# Patient Record
Sex: MALE | Race: WHITE | NOT HISPANIC OR LATINO | Employment: OTHER | URBAN - METROPOLITAN AREA
[De-identification: names, ages, dates, MRNs, and addresses within clinical notes are randomized per-mention and may not be internally consistent; named-entity substitution may affect disease eponyms.]

---

## 2019-04-30 RX ORDER — CELECOXIB 100 MG/1
100 CAPSULE ORAL 2 TIMES DAILY
Qty: 60 CAPSULE | Refills: 0 | Status: SHIPPED | OUTPATIENT
Start: 2019-04-30 | End: 2019-06-24 | Stop reason: SDUPTHER

## 2019-04-30 RX ORDER — FLUOXETINE HYDROCHLORIDE 40 MG/1
40 CAPSULE ORAL DAILY
Qty: 30 CAPSULE | Refills: 0 | Status: SHIPPED | OUTPATIENT
Start: 2019-04-30 | End: 2019-06-24 | Stop reason: SDUPTHER

## 2019-04-30 RX ORDER — TAMSULOSIN HYDROCHLORIDE 0.4 MG/1
CAPSULE ORAL
Qty: 180 CAPSULE | Refills: 0 | Status: SHIPPED | OUTPATIENT
Start: 2019-04-30

## 2019-05-28 ENCOUNTER — OFFICE VISIT (OUTPATIENT)
Dept: INTERNAL MEDICINE | Facility: CLINIC | Age: 79
End: 2019-05-28

## 2019-05-28 VITALS — HEIGHT: 73 IN | BODY MASS INDEX: 25.98 KG/M2 | WEIGHT: 196 LBS

## 2019-05-28 DIAGNOSIS — M19.90 ARTHRITIS: ICD-10-CM

## 2019-05-28 DIAGNOSIS — F32.9 REACTIVE DEPRESSION: ICD-10-CM

## 2019-05-28 DIAGNOSIS — G25.81 RLS (RESTLESS LEGS SYNDROME): ICD-10-CM

## 2019-05-28 DIAGNOSIS — I10 ESSENTIAL HYPERTENSION: Primary | ICD-10-CM

## 2019-05-28 PROBLEM — F32.A DEPRESSION: Status: ACTIVE | Noted: 2019-05-28

## 2019-05-28 PROCEDURE — 99213 OFFICE O/P EST LOW 20 MIN: CPT | Performed by: INTERNAL MEDICINE

## 2019-05-28 RX ORDER — PRAMIPEXOLE DIHYDROCHLORIDE 0.25 MG/1
0.25 TABLET ORAL 3 TIMES DAILY
COMMUNITY
End: 2019-08-14 | Stop reason: SDUPTHER

## 2019-05-28 RX ORDER — HYDROCHLOROTHIAZIDE 12.5 MG/1
12.5 TABLET ORAL DAILY
COMMUNITY
End: 2019-10-23 | Stop reason: SDUPTHER

## 2019-05-28 RX ORDER — LISINOPRIL 20 MG/1
20 TABLET ORAL DAILY
COMMUNITY
End: 2019-08-02 | Stop reason: CLARIF

## 2019-05-28 NOTE — PROGRESS NOTES
Assessment and Plan  Adrian was seen today for hypertension.    Diagnoses and all orders for this visit:    Essential hypertension  Comments:  Controlled, doing great  Orders:  -     Comprehensive Metabolic Panel; Future  -     Lipid Panel With / Chol / HDL Ratio; Future    Reactive depression  Comments:  doing great with fluoxetine    RLS (restless legs syndrome)  Comments:  usually takes 2-3 times daily.      Arthritis  Comments:  stable on nsaids.       F/U and Patient Instructions    Return in about 6 months (around 11/28/2019) for Medicare Wellness with labs.  There are no Patient Instructions on file for this visit.    Subjective    Adrian Balderrama is a 79 y.o. male being seen in our office today for Hypertension     History of the Present Illness  HPI  Here for reg f/u- denies any problems. Stays busy-yard work, etc.  Doesn't have problems with taking the pramipexole 2-03 times daily. Works well.  Seeing Dr. Sanders urology annually    Patient History        Significant Past History  The following portions of the patient's history were reviewed and updated as appropriate:PMHroutine: Social history , Allergies, Current Medications, Active Problem List and Health Maintenance              Social History  He  reports that he has quit smoking. He does not have any smokeless tobacco history on file. He reports that he does not drink alcohol or use drugs.                         Review of Symptoms  Review of Systems   Constitution: Negative.   Cardiovascular: Negative.    Respiratory: Negative.    Musculoskeletal: Positive for arthritis.   Genitourinary: Negative.      Objective  Vital Signs         BP Readings from Last 1 Encounters:   No data found for BP     Wt Readings from Last 3 Encounters:   05/28/19 88.9 kg (196 lb)   Body mass index is 25.86 kg/m².          Physical Exam   Physical Exam   Constitutional: No distress.   Cardiovascular: Normal rate and regular rhythm.   Musculoskeletal: He exhibits no edema.    Psychiatric: He has a normal mood and affect. His behavior is normal.     Data Reviewed    No results found for this or any previous visit (from the past 2016 hour(s)).

## 2019-06-24 RX ORDER — FLUOXETINE HYDROCHLORIDE 40 MG/1
CAPSULE ORAL
Qty: 30 CAPSULE | Refills: 0 | Status: SHIPPED | OUTPATIENT
Start: 2019-06-24 | End: 2019-07-22 | Stop reason: SDUPTHER

## 2019-06-24 RX ORDER — CELECOXIB 100 MG/1
CAPSULE ORAL
Qty: 60 CAPSULE | Refills: 0 | Status: SHIPPED | OUTPATIENT
Start: 2019-06-24 | End: 2019-07-22 | Stop reason: SDUPTHER

## 2019-07-22 RX ORDER — CELECOXIB 100 MG/1
CAPSULE ORAL
Qty: 60 CAPSULE | Refills: 3 | Status: SHIPPED | OUTPATIENT
Start: 2019-07-22 | End: 2019-07-23 | Stop reason: SDUPTHER

## 2019-07-22 RX ORDER — FLUOXETINE HYDROCHLORIDE 40 MG/1
CAPSULE ORAL
Qty: 30 CAPSULE | Refills: 4 | Status: SHIPPED | OUTPATIENT
Start: 2019-07-22 | End: 2019-07-23 | Stop reason: SDUPTHER

## 2019-07-23 RX ORDER — CELECOXIB 100 MG/1
100 CAPSULE ORAL 2 TIMES DAILY
Qty: 180 CAPSULE | Refills: 1 | Status: SHIPPED | OUTPATIENT
Start: 2019-07-23 | End: 2020-03-31

## 2019-07-23 RX ORDER — FLUOXETINE HYDROCHLORIDE 40 MG/1
40 CAPSULE ORAL DAILY
Qty: 90 CAPSULE | Refills: 1 | Status: SHIPPED | OUTPATIENT
Start: 2019-07-23 | End: 2020-07-13

## 2019-08-02 RX ORDER — LISINOPRIL 40 MG/1
40 TABLET ORAL DAILY
COMMUNITY
End: 2019-08-02 | Stop reason: SDUPTHER

## 2019-08-02 RX ORDER — LISINOPRIL 40 MG/1
TABLET ORAL
Qty: 180 TABLET | Refills: 0 | Status: SHIPPED | OUTPATIENT
Start: 2019-08-02 | End: 2019-10-23 | Stop reason: SDUPTHER

## 2019-08-14 ENCOUNTER — TELEPHONE (OUTPATIENT)
Dept: INTERNAL MEDICINE | Facility: CLINIC | Age: 79
End: 2019-08-14

## 2019-08-14 RX ORDER — PRAMIPEXOLE DIHYDROCHLORIDE 0.25 MG/1
TABLET ORAL
Qty: 360 TABLET | Refills: 0 | Status: SHIPPED | OUTPATIENT
Start: 2019-08-14 | End: 2019-12-19

## 2019-08-14 RX ORDER — PRAMIPEXOLE DIHYDROCHLORIDE 0.25 MG/1
0.25 TABLET ORAL 3 TIMES DAILY
Qty: 90 TABLET | Refills: 5 | Status: SHIPPED | OUTPATIENT
Start: 2019-08-14 | End: 2019-08-14 | Stop reason: SDUPTHER

## 2019-10-24 RX ORDER — HYDROCHLOROTHIAZIDE 12.5 MG/1
TABLET ORAL
Qty: 90 TABLET | Refills: 0 | Status: SHIPPED | OUTPATIENT
Start: 2019-10-24 | End: 2020-03-04

## 2019-10-24 RX ORDER — LISINOPRIL 40 MG/1
TABLET ORAL
Qty: 180 TABLET | Refills: 0 | Status: SHIPPED | OUTPATIENT
Start: 2019-10-24 | End: 2020-01-31

## 2019-11-19 DIAGNOSIS — I10 ESSENTIAL HYPERTENSION: ICD-10-CM

## 2019-11-19 LAB
ALBUMIN SERPL-MCNC: 4.4 G/DL (ref 3.5–5.2)
ALBUMIN/GLOB SERPL: 2.3 G/DL
ALP SERPL-CCNC: 65 U/L (ref 39–117)
ALT SERPL-CCNC: 14 U/L (ref 1–41)
AST SERPL-CCNC: 16 U/L (ref 1–40)
BILIRUB SERPL-MCNC: 0.5 MG/DL (ref 0.2–1.2)
BUN SERPL-MCNC: 25 MG/DL (ref 8–23)
BUN/CREAT SERPL: 19.8 (ref 7–25)
CALCIUM SERPL-MCNC: 9.1 MG/DL (ref 8.6–10.5)
CHLORIDE SERPL-SCNC: 100 MMOL/L (ref 98–107)
CHOLEST SERPL-MCNC: 161 MG/DL (ref 0–200)
CHOLEST/HDLC SERPL: 3.93 {RATIO}
CO2 SERPL-SCNC: 26.8 MMOL/L (ref 22–29)
CREAT SERPL-MCNC: 1.26 MG/DL (ref 0.76–1.27)
GLOBULIN SER CALC-MCNC: 1.9 GM/DL
GLUCOSE SERPL-MCNC: 95 MG/DL (ref 65–99)
HDLC SERPL-MCNC: 41 MG/DL (ref 40–60)
LDLC SERPL CALC-MCNC: 100 MG/DL (ref 0–100)
POTASSIUM SERPL-SCNC: 4.4 MMOL/L (ref 3.5–5.2)
PROT SERPL-MCNC: 6.3 G/DL (ref 6–8.5)
SODIUM SERPL-SCNC: 139 MMOL/L (ref 136–145)
TRIGL SERPL-MCNC: 101 MG/DL (ref 0–150)
VLDLC SERPL CALC-MCNC: 20.2 MG/DL

## 2019-11-26 ENCOUNTER — OFFICE VISIT (OUTPATIENT)
Dept: INTERNAL MEDICINE | Facility: CLINIC | Age: 79
End: 2019-11-26

## 2019-11-26 VITALS
HEART RATE: 74 BPM | DIASTOLIC BLOOD PRESSURE: 62 MMHG | WEIGHT: 217 LBS | BODY MASS INDEX: 28.63 KG/M2 | SYSTOLIC BLOOD PRESSURE: 124 MMHG

## 2019-11-26 DIAGNOSIS — Z99.89 OSA ON CPAP: ICD-10-CM

## 2019-11-26 DIAGNOSIS — G25.81 RLS (RESTLESS LEGS SYNDROME): Chronic | ICD-10-CM

## 2019-11-26 DIAGNOSIS — I10 ESSENTIAL HYPERTENSION: Primary | ICD-10-CM

## 2019-11-26 DIAGNOSIS — G47.33 OSA ON CPAP: ICD-10-CM

## 2019-11-26 DIAGNOSIS — Z00.00 MEDICARE ANNUAL WELLNESS VISIT, SUBSEQUENT: ICD-10-CM

## 2019-11-26 DIAGNOSIS — F32.9 REACTIVE DEPRESSION: ICD-10-CM

## 2019-11-26 PROBLEM — Z96.642 S/P HIP REPLACEMENT, LEFT: Status: ACTIVE | Noted: 2018-03-28

## 2019-11-26 PROBLEM — R33.9 URINARY RETENTION: Status: ACTIVE | Noted: 2018-03-28

## 2019-11-26 PROCEDURE — G0439 PPPS, SUBSEQ VISIT: HCPCS | Performed by: INTERNAL MEDICINE

## 2019-11-26 RX ORDER — ALPRAZOLAM 0.5 MG/1
0.5 TABLET ORAL DAILY PRN
COMMUNITY
Start: 2016-03-02 | End: 2019-12-18 | Stop reason: SDUPTHER

## 2019-11-26 NOTE — PROGRESS NOTES
The ABCs of the Annual Wellness Visit  Subsequent Medicare Wellness Visit    Chief Complaint   Patient presents with   • Medicare Wellness-subsequent       Subjective   History of Present Illness:  Adrian Balderrama is a 79 y.o. male who presents for a Subsequent Medicare Wellness Visit.    HEALTH RISK ASSESSMENT    Recent Hospitalizations:  No hospitalization(s) within the last year.    Current Medical Providers:  Patient Care Team:  Dior Garsia MD as PCP - General (Internal Medicine)  Dior Garsia MD as PCP - [facility]  Vj Sanders MD as Consulting Physician (Urology)    Smoking Status:  Social History     Tobacco Use   Smoking Status Former Smoker       Alcohol Consumption:  Social History     Substance and Sexual Activity   Alcohol Use No   • Frequency: Never       Depression Screen:   PHQ-2/PHQ-9 Depression Screening 11/26/2019   Little interest or pleasure in doing things 0   Feeling down, depressed, or hopeless 0   Total Score 0       Fall Risk Screen:  DAVIAN Fall Risk Assessment was completed, and patient is at LOW risk for falls.Assessment completed on:11/26/2019    Health Habits and Functional and Cognitive Screening:  Functional & Cognitive Status 11/26/2019   Do you have difficulty preparing food and eating? No   Do you have difficulty bathing yourself, getting dressed or grooming yourself? No   Do you have difficulty using the toilet? No   Do you have difficulty moving around from place to place? No   Do you have trouble with steps or getting out of a bed or a chair? No   Current Diet Frequent Junk Food   Dental Exam Up to date   Eye Exam Not up to date   Exercise (times per week) 5 times per week   Current Exercise Activities Include Light Weight/Kettebells   Do you need help using the phone?  No   Are you deaf or do you have serious difficulty hearing?  Yes   Do you need help with transportation? Yes   Do you need help shopping? No   Do you need help preparing meals?  No   Do  you need help with housework?  No   Do you need help with laundry? No   Do you need help taking your medications? No   Do you need help managing money? No   Do you ever drive or ride in a car without wearing a seat belt? No   Have you felt unusual stress, anger or loneliness in the last month? No   Who do you live with? Alone   If you need help, do you have trouble finding someone available to you? No   Do you have difficulty concentrating, remembering or making decisions? No         Does the patient have evidence of cognitive impairment? No    Asprin use counseling:Does not need ASA (and currently is not on it)    Age-appropriate Screening Schedule:  Refer to the list below for future screening recommendations based on patient's age, sex and/or medical conditions. Orders for these recommended tests are listed in the plan section. The patient has been provided with a written plan.    Health Maintenance   Topic Date Due   • TDAP/TD VACCINES (1 - Tdap) 03/17/1959   • ZOSTER VACCINE (2 of 3) 03/08/2011   • PNEUMOCOCCAL VACCINES (65+ LOW/MEDIUM RISK) (2 of 2 - PPSV23) 01/15/2016   • INFLUENZA VACCINE  08/01/2019          The following portions of the patient's history were reviewed and updated as appropriate: allergies, current medications, past family history, past medical history, past social history, past surgical history and problem list.    Outpatient Medications Prior to Visit   Medication Sig Dispense Refill   • ALPRAZolam (XANAX) 0.5 MG tablet Take 0.5 mg by mouth Daily As Needed for Anxiety. Prn flighting.     • celecoxib (CeleBREX) 100 MG capsule Take 1 capsule by mouth 2 (Two) Times a Day. 180 capsule 1   • FLUoxetine (PROzac) 40 MG capsule Take 1 capsule by mouth Daily. 90 capsule 1   • hydroCHLOROthiazide (HYDRODIURIL) 12.5 MG tablet TAKE ONE TABLET BY MOUTH DAILY AS NEEDED 90 tablet 0   • lisinopril (PRINIVIL,ZESTRIL) 40 MG tablet TAKE ONE TABLET BY MOUTH TWO TIMES A DAY. *PT NEEDS APPOINTMENT FOR MORE  REFILLS 180 tablet 0   • pramipexole (MIRAPEX) 0.25 MG tablet TAKE THREE TO FOUR TABLETS BY MOUTH DAILY AS NEEDED 360 tablet 0   • tamsulosin (FLOMAX) 0.4 MG capsule 24 hr capsule Take two capsules by mouth every evening 180 capsule 0     No facility-administered medications prior to visit.        Patient Active Problem List   Diagnosis   • Depression   • RLS (restless legs syndrome)   • Arthritis   • Essential hypertension   • LUANN on CPAP   • Primary osteoarthritis of right knee   • Prostate cancer screening   • S/P hip replacement, left   • Urinary retention       Advanced Care Planning:  Patient has an advance directive - a copy has not been provided. Have asked the patient to send this to us to add to record    Review of Systems   Constitutional: Negative for unexpected weight change.   HENT: Negative.  Trouble swallowing: no problems if he eats a little more slowly.    Respiratory: Negative.    Cardiovascular: Negative.    Gastrointestinal: Negative.  Diarrhea: takes antidiarrheal since colon surgery.       Compared to one year ago, the patient feels his physical health is the same.  Compared to one year ago, the patient feels his mental health is the same.    Reviewed chart for potential of high risk medication in the elderly: yes  Reviewed chart for potential of harmful drug interactions in the elderly:yes    Objective         Vitals:    11/26/19 1056   BP: 124/62   Pulse: 74   Weight: 98.4 kg (217 lb)       Body mass index is 28.63 kg/m².  Discussed the patient's BMI with him. The BMI is above average; BMI management plan is completed.    Physical Exam   Constitutional: No distress.   Neck: No thyromegaly present.   Cardiovascular: Normal rate and regular rhythm.   Pulmonary/Chest: Effort normal and breath sounds normal.   Abdominal: Soft. Bowel sounds are normal.   Musculoskeletal: Normal range of motion. He exhibits no edema.   Lymphadenopathy:     He has no cervical adenopathy.       Lab Results    Component Value Date    GLU 95 11/19/2019    CHLPL 161 11/19/2019    TRIG 101 11/19/2019    HDL 41 11/19/2019     11/19/2019    VLDL 20.2 11/19/2019        Assessment/Plan   Medicare Risks and Personalized Health Plan  CMS Preventative Services Quick Reference  Immunizations Discussed/Encouraged (specific immunizations; Influenza )    The above risks/problems have been discussed with the patient.  Pertinent information has been shared with the patient in the After Visit Summary.  Follow up plans and orders are seen below in the Assessment/Plan Section.    Diagnoses and all orders for this visit:    1. Essential hypertension (Primary)  Comments:  Doing great, no change, continue to check periodically.    2. LUANN on CPAP  Comments:  stable    3. Reactive depression  Comments:  stable    4. RLS (restless legs syndrome)  Comments:  no current issues.    5. Medicare annual wellness visit, subsequent  Comments:  No new issues- discussed labs, lifestyle- increase exercise.  Recheck in 6 months.       Follow Up:  Return in about 6 months (around 5/26/2020).     An After Visit Summary and PPPS were given to the patient.

## 2019-12-18 ENCOUNTER — TELEPHONE (OUTPATIENT)
Dept: INTERNAL MEDICINE | Facility: CLINIC | Age: 79
End: 2019-12-18

## 2019-12-18 DIAGNOSIS — F32.9 REACTIVE DEPRESSION: Primary | ICD-10-CM

## 2019-12-18 RX ORDER — TADALAFIL 10 MG/1
10 TABLET ORAL DAILY PRN
Qty: 10 TABLET | Refills: 3 | Status: SHIPPED | OUTPATIENT
Start: 2019-12-18 | End: 2021-02-04 | Stop reason: SDUPTHER

## 2019-12-18 RX ORDER — ALPRAZOLAM 0.5 MG/1
0.5 TABLET ORAL DAILY
Qty: 30 TABLET | Refills: 0 | Status: SHIPPED | OUTPATIENT
Start: 2019-12-18 | End: 2020-09-15 | Stop reason: SDUPTHER

## 2019-12-18 NOTE — TELEPHONE ENCOUNTER
Dr. PATRICIO Pt called said that you was going to call in Xanax for him?  Also, wanted to know if you will call in cialis for him too.    PT # [phone]  Khari

## 2019-12-19 RX ORDER — PRAMIPEXOLE DIHYDROCHLORIDE 0.25 MG/1
TABLET ORAL
Qty: 360 TABLET | Refills: 0 | Status: SHIPPED | OUTPATIENT
Start: 2019-12-19 | End: 2020-03-31

## 2020-01-31 RX ORDER — LISINOPRIL 40 MG/1
TABLET ORAL
Qty: 180 TABLET | Refills: 0 | Status: SHIPPED | OUTPATIENT
Start: 2020-01-31 | End: 2020-07-13

## 2020-03-04 RX ORDER — HYDROCHLOROTHIAZIDE 12.5 MG/1
TABLET ORAL
Qty: 90 TABLET | Refills: 0 | Status: SHIPPED | OUTPATIENT
Start: 2020-03-04 | End: 2020-07-13

## 2020-03-31 RX ORDER — PRAMIPEXOLE DIHYDROCHLORIDE 0.25 MG/1
TABLET ORAL
Qty: 360 TABLET | Refills: 0 | Status: SHIPPED | OUTPATIENT
Start: 2020-03-31 | End: 2020-07-13

## 2020-03-31 RX ORDER — CELECOXIB 100 MG/1
CAPSULE ORAL
Qty: 180 CAPSULE | Refills: 0 | Status: SHIPPED | OUTPATIENT
Start: 2020-03-31 | End: 2020-07-13

## 2020-07-10 ENCOUNTER — TELEPHONE (OUTPATIENT)
Dept: INTERNAL MEDICINE | Facility: CLINIC | Age: 80
End: 2020-07-10

## 2020-07-10 NOTE — TELEPHONE ENCOUNTER
Please call pt, find out how long he has been dizzy for? He may need to come in and see me next week and Dr. Garsia is on vacation.

## 2020-07-10 NOTE — TELEPHONE ENCOUNTER
PATIENT REQUESTED CHANGE OF CELEBREX TO MELOXICAM TO TREAT INFLAMMATION.      PATIENT ALSO STATED THAT HE BELIEVES THE MANUFACTURE OF FLUOXETINE HAS CHANGED. AT ONE TIME THE MEDICATION WAS MADE BY [employer]  IT WAS A WHITE PILL WITH ORANGE STRIPES AND HE HAD NO PROBLEMS WITH THE MEDICATION.  PATIENT STATES NOW THE PILL CHANGED TO A GREEN/ORANGE COLOR AND HE IS EXPERIENCING DIZZINESS AND INGESTION.  PLEASE ADVISE    PATIENTS CALL BACK NUMBER [phone]    PATIENTS PHARMACY IS: [address] [place] [address] & [address] [facility] - [phone] [facility] [phone] FX

## 2020-07-13 RX ORDER — HYDROCHLOROTHIAZIDE 12.5 MG/1
TABLET ORAL
Qty: 90 TABLET | Refills: 0 | Status: SHIPPED | OUTPATIENT
Start: 2020-07-13 | End: 2020-11-09

## 2020-07-13 RX ORDER — PRAMIPEXOLE DIHYDROCHLORIDE 0.25 MG/1
TABLET ORAL
Qty: 360 TABLET | Refills: 0 | Status: SHIPPED | OUTPATIENT
Start: 2020-07-13 | End: 2020-11-09

## 2020-07-13 RX ORDER — FLUOXETINE HYDROCHLORIDE 40 MG/1
CAPSULE ORAL
Qty: 90 CAPSULE | Refills: 0 | Status: SHIPPED | OUTPATIENT
Start: 2020-07-13 | End: 2020-11-09

## 2020-07-13 RX ORDER — CELECOXIB 100 MG/1
CAPSULE ORAL
Qty: 180 CAPSULE | Refills: 0 | Status: SHIPPED | OUTPATIENT
Start: 2020-07-13 | End: 2020-09-15

## 2020-07-13 RX ORDER — LISINOPRIL 40 MG/1
TABLET ORAL
Qty: 180 TABLET | Refills: 0 | Status: SHIPPED | OUTPATIENT
Start: 2020-07-13 | End: 2020-10-19

## 2020-07-13 NOTE — TELEPHONE ENCOUNTER
PT informed and understood information. He said he is going to just wait until his next appt in august to discuss this with Dr. Garsia. I let him know if he has any other issues, or dizziness then please give us a call.

## 2020-07-13 NOTE — TELEPHONE ENCOUNTER
He states the first night that he took the new rx it was before bed and he woke up dizzy the next morning, he said he had some left over from his last refill and switched back to that one and has not had any dizziness since.

## 2020-07-13 NOTE — TELEPHONE ENCOUNTER
Have him continue the celebrex and stop the meloxicam. Continue to monitor signs of dizziness and fup with Dr. Garsia when she returns next week please. Should he have any other symptoms of dizziness however he needs to come in and be see soon sooner.

## 2020-07-23 ENCOUNTER — TELEPHONE (OUTPATIENT)
Dept: INTERNAL MEDICINE | Facility: CLINIC | Age: 80
End: 2020-07-23

## 2020-07-23 NOTE — TELEPHONE ENCOUNTER
PATIENT REQUESTED TO HAVE SOMETHING CALLED IN TO HELP WITH HIS ITCHING THAT HE HAS GOING ON. PATIENT REQUESTED TO GET A PHONE CALL ABOUT THIS MATTER.    BEST CALL BACK  [phone] OR  [phone]

## 2020-09-15 ENCOUNTER — OFFICE VISIT (OUTPATIENT)
Dept: INTERNAL MEDICINE | Facility: CLINIC | Age: 80
End: 2020-09-15

## 2020-09-15 VITALS
WEIGHT: 217 LBS | TEMPERATURE: 98 F | HEART RATE: 72 BPM | HEIGHT: 73 IN | SYSTOLIC BLOOD PRESSURE: 124 MMHG | DIASTOLIC BLOOD PRESSURE: 68 MMHG | BODY MASS INDEX: 28.76 KG/M2

## 2020-09-15 DIAGNOSIS — M54.50 CHRONIC MIDLINE LOW BACK PAIN WITHOUT SCIATICA: ICD-10-CM

## 2020-09-15 DIAGNOSIS — G89.29 CHRONIC MIDLINE LOW BACK PAIN WITHOUT SCIATICA: ICD-10-CM

## 2020-09-15 DIAGNOSIS — F32.9 REACTIVE DEPRESSION: ICD-10-CM

## 2020-09-15 DIAGNOSIS — I10 ESSENTIAL HYPERTENSION: ICD-10-CM

## 2020-09-15 DIAGNOSIS — Z23 NEED FOR INFLUENZA VACCINATION: Primary | ICD-10-CM

## 2020-09-15 PROBLEM — C18.6 MALIGNANT NEOPLASM OF DESCENDING COLON: Status: ACTIVE | Noted: 2020-09-15

## 2020-09-15 PROCEDURE — 90694 VACC AIIV4 NO PRSRV 0.5ML IM: CPT | Performed by: INTERNAL MEDICINE

## 2020-09-15 PROCEDURE — 99214 OFFICE O/P EST MOD 30 MIN: CPT | Performed by: INTERNAL MEDICINE

## 2020-09-15 PROCEDURE — G0008 ADMIN INFLUENZA VIRUS VAC: HCPCS | Performed by: INTERNAL MEDICINE

## 2020-09-15 RX ORDER — LOPERAMIDE HYDROCHLORIDE 2 MG/1
2 CAPSULE ORAL AS NEEDED
Qty: 180 CAPSULE | Refills: 5 | Status: SHIPPED | OUTPATIENT
Start: 2020-09-15 | End: 2021-06-01

## 2020-09-15 RX ORDER — ALPRAZOLAM 0.5 MG/1
0.5 TABLET ORAL DAILY PRN
Qty: 10 TABLET | Refills: 0 | Status: SHIPPED | OUTPATIENT
Start: 2020-09-15 | End: 2022-11-02 | Stop reason: SDUPTHER

## 2020-09-15 RX ORDER — MELOXICAM 7.5 MG/1
7.5 TABLET ORAL DAILY
Qty: 90 TABLET | Refills: 0 | Status: SHIPPED | OUTPATIENT
Start: 2020-09-15 | End: 2020-11-09

## 2020-09-15 NOTE — PROGRESS NOTES
Assessment and Plan  Adrian was seen today for hypertension.    Diagnoses and all orders for this visit:    Need for influenza vaccination  -     Fluad Quad 65+ yrs (1035-3077)    Essential hypertension  Comments:  controlled, no change.  Orders:  -     CBC & Differential; Future  -     Comprehensive Metabolic Panel; Future  -     Lipid Panel With / Chol / HDL Ratio; Future    Reactive depression  Comments:  stable on fluoxetine, refill alprazolam #10, watch requests.   Orders:  -     ALPRAZolam (XANAX) 0.5 MG tablet; Take 1 tablet by mouth Daily As Needed for Anxiety. Prn flighting.    Chronic midline low back pain without sciatica  Comments:  willing try physical therapy  Orders:  -     Ambulatory Referral to Physical Therapy Evaluate and treat    Reactive depression  -     ALPRAZolam (XANAX) 0.5 MG tablet; Take 1 tablet by mouth Daily As Needed for Anxiety. Prn flighting.    Other orders  -     loperamide (IMODIUM) 2 MG capsule; Take 1 capsule by mouth As Needed for Diarrhea. Can take up to 6 tablets daily  -     meloxicam (Mobic) 7.5 MG tablet; Take 1 tablet by mouth Daily.      F/U and Patient Instructions    Return in about 4 years (around 9/15/2024) for Medicare Wellness, Lab Before FUP.  There are no Patient Instructions on file for this visit.    Subjective    Adrian Balderrama is a 80 y.o. male being seen in our office today for Hypertension     History of the Present Illness  HPI  Here for reg f/u- he's been having some back issues- had epidurals and saw a chiropractor- better but still a problem.  He has not tried PT for this yet. Would like to try to switch nsaid off the celebrex- not sure it's helping any longer.   No nocturia but does have some occ urgency during the day-   Uses anti-diarrheal daily since his colon surgery in 2005-   Would like to have alprazolam at home, just #10-     Patient History        Significant Past History  The following portions of the patient's history were reviewed and  updated as appropriate:PMHroutine: Social history , Allergies, Current Medications, Active Problem List and Health Maintenance              Social History  He  reports that he has quit smoking. He does not have any smokeless tobacco history on file. He reports that he does not drink alcohol or use drugs.                         Review of Symptoms  Review of Systems   Constitution: Negative.   Cardiovascular: Negative.    Respiratory: Negative.    Musculoskeletal: Positive for back pain.   Gastrointestinal: Negative.    Genitourinary: Negative.    Psychiatric/Behavioral: Negative.      Objective  Vital Signs         BP Readings from Last 1 Encounters:   09/15/20 124/68     Wt Readings from Last 3 Encounters:   09/15/20 98.4 kg (217 lb)   11/26/19 98.4 kg (217 lb)   05/28/19 88.9 kg (196 lb)   Body mass index is 28.63 kg/m².          Physical Exam   Physical Exam  Constitutional:       Appearance: Normal appearance.   Cardiovascular:      Rate and Rhythm: Normal rate.   Pulmonary:      Effort: Pulmonary effort is normal.      Breath sounds: Normal breath sounds.   Musculoskeletal:         General: No swelling or tenderness.      Right lower leg: No edema.      Left lower leg: No edema.   Skin:     General: Skin is warm.       Data Reviewed    No results found for this or any previous visit (from the past 2016 hour(s)).

## 2020-10-01 ENCOUNTER — TELEPHONE (OUTPATIENT)
Dept: INTERNAL MEDICINE | Facility: CLINIC | Age: 80
End: 2020-10-01

## 2020-10-01 NOTE — TELEPHONE ENCOUNTER
PATIENT IS CALLING WANTING TO CANCEL HIS THERAPY THAT WAS SCHEDULE FOR HIM      PATIENT CONTACT@[phone]

## 2020-10-19 RX ORDER — LISINOPRIL 40 MG/1
TABLET ORAL
Qty: 180 TABLET | Refills: 0 | Status: SHIPPED | OUTPATIENT
Start: 2020-10-19 | End: 2021-02-08

## 2020-10-20 ENCOUNTER — TELEPHONE (OUTPATIENT)
Dept: INTERNAL MEDICINE | Facility: CLINIC | Age: 80
End: 2020-10-20

## 2020-10-20 NOTE — TELEPHONE ENCOUNTER
Patient calling and states he called his insurance company to get a scooter. They advised him to call his PCP to get an order placed for one. He states he is having back and knee trouble and has a hard time walking.    Please call patient at [phone](H) or [phone](C).

## 2020-11-09 RX ORDER — HYDROCHLOROTHIAZIDE 12.5 MG/1
TABLET ORAL
Qty: 90 TABLET | Refills: 0 | Status: SHIPPED | OUTPATIENT
Start: 2020-11-09 | End: 2021-02-08

## 2020-11-09 RX ORDER — PRAMIPEXOLE DIHYDROCHLORIDE 0.25 MG/1
TABLET ORAL
Qty: 360 TABLET | Refills: 0 | Status: SHIPPED | OUTPATIENT
Start: 2020-11-09 | End: 2021-02-08

## 2020-11-09 RX ORDER — FLUOXETINE HYDROCHLORIDE 40 MG/1
CAPSULE ORAL
Qty: 90 CAPSULE | Refills: 0 | Status: SHIPPED | OUTPATIENT
Start: 2020-11-09 | End: 2021-02-08

## 2020-11-09 RX ORDER — CELECOXIB 100 MG/1
CAPSULE ORAL
Qty: 180 CAPSULE | Refills: 0 | Status: SHIPPED | OUTPATIENT
Start: 2020-11-09 | End: 2021-08-18

## 2020-11-24 NOTE — TELEPHONE ENCOUNTER
Pt  daughter calling back for an update from Dior on the request for pt's scooter. Please call pt back and advise at [phone](c) [phone](c)

## 2021-01-14 ENCOUNTER — TELEPHONE (OUTPATIENT)
Dept: INTERNAL MEDICINE | Facility: CLINIC | Age: 81
End: 2021-01-14

## 2021-01-14 NOTE — TELEPHONE ENCOUNTER
Caller: Adrian Balderrama    Relationship: Self    Best call back number: [phone]    PATIENT WOULD LIKE A NEW ORDER FOR A SCOOTER SENT TO [facility].    HE SAID HE HAD PUT IN A REQUEST FOR ONE BACK IN October 2020, BUT NEVER RECEIVED AN UPDATE ON WHETHER IT WAS SENT OVER, HE CALLED [facility] AND THEY SAID THEY HAVE NO RECORD OF IT.    THE PATIENT WOULD LIKE A CALLBACK WHEN NEW ORDER IS PLACED

## 2021-01-30 DIAGNOSIS — I10 ESSENTIAL HYPERTENSION: Primary | ICD-10-CM

## 2021-02-01 RX ORDER — LISINOPRIL 40 MG/1
TABLET ORAL
Qty: 180 TABLET | Refills: 0 | OUTPATIENT
Start: 2021-02-01

## 2021-02-01 RX ORDER — HYDROCHLOROTHIAZIDE 12.5 MG/1
TABLET ORAL
Qty: 90 TABLET | Refills: 0 | OUTPATIENT
Start: 2021-02-01

## 2021-02-01 RX ORDER — PRAMIPEXOLE DIHYDROCHLORIDE 0.25 MG/1
TABLET ORAL
Qty: 360 TABLET | Refills: 0 | OUTPATIENT
Start: 2021-02-01

## 2021-02-01 RX ORDER — FLUOXETINE HYDROCHLORIDE 40 MG/1
CAPSULE ORAL
Qty: 90 CAPSULE | Refills: 0 | OUTPATIENT
Start: 2021-02-01

## 2021-02-04 ENCOUNTER — OFFICE VISIT (OUTPATIENT)
Dept: INTERNAL MEDICINE | Facility: CLINIC | Age: 81
End: 2021-02-04

## 2021-02-04 VITALS
SYSTOLIC BLOOD PRESSURE: 132 MMHG | BODY MASS INDEX: 29.29 KG/M2 | DIASTOLIC BLOOD PRESSURE: 74 MMHG | HEIGHT: 73 IN | HEART RATE: 80 BPM | TEMPERATURE: 97.5 F | WEIGHT: 221 LBS

## 2021-02-04 DIAGNOSIS — F32.9 REACTIVE DEPRESSION: ICD-10-CM

## 2021-02-04 DIAGNOSIS — F40.243 FEAR OF FLYING: ICD-10-CM

## 2021-02-04 DIAGNOSIS — Z99.89 OSA ON CPAP: ICD-10-CM

## 2021-02-04 DIAGNOSIS — M19.90 ARTHRITIS: ICD-10-CM

## 2021-02-04 DIAGNOSIS — Z00.00 MEDICARE ANNUAL WELLNESS VISIT, SUBSEQUENT: ICD-10-CM

## 2021-02-04 DIAGNOSIS — Z12.5 PROSTATE CANCER SCREENING: Primary | ICD-10-CM

## 2021-02-04 DIAGNOSIS — I10 ESSENTIAL HYPERTENSION: ICD-10-CM

## 2021-02-04 DIAGNOSIS — G47.33 OSA ON CPAP: ICD-10-CM

## 2021-02-04 PROBLEM — N52.8 OTHER MALE ERECTILE DYSFUNCTION: Status: ACTIVE | Noted: 2021-02-04

## 2021-02-04 PROCEDURE — G0439 PPPS, SUBSEQ VISIT: HCPCS | Performed by: INTERNAL MEDICINE

## 2021-02-04 RX ORDER — TADALAFIL 20 MG/1
20 TABLET ORAL DAILY PRN
Qty: 10 TABLET | Refills: 1 | Status: SHIPPED | OUTPATIENT
Start: 2021-02-04

## 2021-02-04 RX ORDER — FLUTICASONE PROPIONATE 50 MCG
2 SPRAY, SUSPENSION (ML) NASAL DAILY
Qty: 18.2 ML | Refills: 3 | Status: SHIPPED | OUTPATIENT
Start: 2021-02-04

## 2021-02-07 DIAGNOSIS — I10 ESSENTIAL HYPERTENSION: Primary | ICD-10-CM

## 2021-02-08 DIAGNOSIS — I10 ESSENTIAL HYPERTENSION: Primary | ICD-10-CM

## 2021-02-08 RX ORDER — HYDROCHLOROTHIAZIDE 12.5 MG/1
TABLET ORAL
Qty: 90 TABLET | Refills: 2 | Status: SHIPPED | OUTPATIENT
Start: 2021-02-08 | End: 2021-10-25

## 2021-02-08 RX ORDER — PRAMIPEXOLE DIHYDROCHLORIDE 0.25 MG/1
TABLET ORAL
Qty: 360 TABLET | Refills: 2 | Status: SHIPPED | OUTPATIENT
Start: 2021-02-08 | End: 2021-10-25

## 2021-02-08 RX ORDER — FLUOXETINE HYDROCHLORIDE 40 MG/1
CAPSULE ORAL
Qty: 90 CAPSULE | Refills: 2 | Status: SHIPPED | OUTPATIENT
Start: 2021-02-08 | End: 2021-10-25

## 2021-02-08 RX ORDER — LISINOPRIL 40 MG/1
TABLET ORAL
Qty: 180 TABLET | Refills: 1 | Status: SHIPPED | OUTPATIENT
Start: 2021-02-08 | End: 2021-07-27

## 2021-03-04 ENCOUNTER — TRANSCRIBE ORDERS (OUTPATIENT)
Dept: INTERNAL MEDICINE | Facility: CLINIC | Age: 81
End: 2021-03-04

## 2021-03-04 DIAGNOSIS — M15.9 GENERALIZED OSTEOARTHROSIS, INVOLVING MULTIPLE SITES: Primary | ICD-10-CM

## 2021-03-08 ENCOUNTER — HOSPITAL ENCOUNTER (OUTPATIENT)
Dept: PHYSICAL THERAPY | Facility: HOSPITAL | Age: 81
Setting detail: THERAPIES SERIES
Discharge: HOME | End: 2021-03-08

## 2021-03-08 DIAGNOSIS — Z74.09 IMPAIRED FUNCTIONAL MOBILITY, BALANCE, AND ENDURANCE: ICD-10-CM

## 2021-03-08 DIAGNOSIS — M54.50 CHRONIC LOW BACK PAIN WITHOUT SCIATICA, UNSPECIFIED BACK PAIN LATERALITY: ICD-10-CM

## 2021-03-08 DIAGNOSIS — Z46.89 FITTING AND ADJUSTMENT OF WHEELCHAIR: Primary | ICD-10-CM

## 2021-03-08 DIAGNOSIS — M15.9 GENERALIZED OSTEOARTHROSIS, INVOLVING MULTIPLE SITES: ICD-10-CM

## 2021-03-08 DIAGNOSIS — G89.29 CHRONIC LOW BACK PAIN WITHOUT SCIATICA, UNSPECIFIED BACK PAIN LATERALITY: ICD-10-CM

## 2021-03-08 PROCEDURE — 97162 PT EVAL MOD COMPLEX 30 MIN: CPT

## 2021-03-08 NOTE — THERAPY EVALUATION
.Outpatient Physical Therapy Neuro Initial Evaluation  [facility]     Patient Name: Adrian Balderrama  : 1940  MRN: [id number]  Today's Date: 3/8/2021      Visit Date: 2021    Patient Active Problem List   Diagnosis   • Depression   • RLS (restless legs syndrome)   • Arthritis   • Essential hypertension   • LUANN on CPAP   • Primary osteoarthritis of right knee   • Prostate cancer screening   • S/P hip replacement, left   • Urinary retention   • Malignant neoplasm of descending colon (CMS/HCC)   • erectile dysfunction        Past Medical History:   Diagnosis Date   • Arthritis    • Depression    • Hypertension    • RLS (restless legs syndrome)         Past Surgical History:   Procedure Laterality Date   • COLON SURGERY      cancer- treated with chemo   • JOINT REPLACEMENT     • REPLACEMENT TOTAL KNEE BILATERAL     • TOTAL HIP ARTHROPLASTY Left          Visit Dx:     ICD-10-CM ICD-9-CM   1. Fitting and adjustment of wheelchair  Z46.89 V53.8   2. Generalized osteoarthrosis, involving multiple sites  M15.9 715.09   3. Chronic low back pain without sciatica, unspecified back pain laterality  M54.5 724.2    G89.29 338.29   4. Impaired functional mobility, balance, and endurance  Z74.09 V49.89       Patient History     Row Name 21 0902             History    Chief Complaint  Balance Problems;Difficulty Walking;Difficulty with daily activities;Pain  -JOLANTA      Type of Pain  Back pain chronic low back pain   -JOLANTA      Date Current Problem(s) Began  21 referral date   -JOLANTA      Brief Description of Current Complaint  Pt has long history of chronic low back pain which limits his mobility. MRI reveals lumbar degenerative changes. He reports his back pain increases in standing but relieved in sitting. He denies falls but does hold onto furniture or walls for balance especially when pain increases. Pt said he has noticed his balance has decreased. With this, he struggles to complete his ADLs.   -JOLANTA       Previous treatment for THIS PROBLEM  Injections per pt seen by pain management MD   -JOLANTA      Patient/Caregiver Goals  Other (comment) evaluation for scooter   -JOLANTA      Hand Dominance  right-handed  -JOLANTA      Occupation/sports/leisure activities  Pt is retired   -JOLANTA         Pain     Pain Location  Back  -JOLANTA      Pain at Present  2  -JOLANTA      Pain at Worst  6  -JOLANTA      Tolerance Time- Standing  limited due to back pain   -JOLANTA      Difficulties with ADL's?  yes limited with cooking due to unable to stand for very long   -JOLANTA         Fall Risk Assessment    Any falls in the past year:  No  -JOLANTA         Daily Activities    Primary Language  English  -JOLANTA         Safety    Are you being hurt, hit, or frightened by anyone at home or in your life?  No  -JOLANTA      Are you being neglected by a caregiver  No  -JOLANTA        User Key  (r) = Recorded By, (t) = Taken By, (c) = Cosigned By    Initials Name Provider Type    Magalis Tanner, PT Physical Therapist              PT Neuro     Row Name 03/08/21 0981             Subjective Comments    Subjective Comments  Pt reports his ambulation is limited due to his back pain. He said he feels it has affected his balance as well.   -JOLANTA         Precautions and Contraindications    Precautions/Limitations  fall precautions  -JOLANTA         Subjective Pain    Able to rate subjective pain?  yes  -JOLANTA      Pre-Treatment Pain Level  2  -JOLANTA      Post-Treatment Pain Level  3  -JOLANTA      Subjective Pain Comment  chronic low back pain   -JOLANTA         Home Living    Current Living Arrangements  home/apartment/condo  -JOLANTA      Home Accessibility  stairs to enter home;stairs within home pt lives in a tri level home  -JOLANTA      Number of Stairs, Main Entrance  seven  -JOLANTA      Stair Railings, Main Entrance  railings on both sides of stairs  -JOLANTA      Stairs, Within Home, Primary  4-6 steps between levels of his tri level home   -JOLATNA      Stair Railings, Within Home, Primary  railings on both sides of stairs  -JOLANTA       "Home Equipment  Cane;Rolling walker bath seat   -JOLANTA         Vision-Basic Assessment    Current Vision  No visual deficits  -JOLANTA         Cognition    Overall Cognitive Status  WFL  -JOLANTA      Memory  Appears intact  -JOLANTA      Orientation Level  Oriented X4  -JOLANTA      Safety Judgment  Good awareness of safety precautions  -JOLANTA      Deficits  Fully aware of deficits  -JOLANTA         Sensation    Light Touch  No apparent deficits c/o some numbness \"2 middle toes\" each foot  -JOLANTA         Posture/Observations    Alignment Options  Forward head;Rounded shoulders  -JOLANTA      Forward Head  Mild  -JOLANTA      Rounded Shoulders  Mild  -JOLANTA      Posture/Observations Comments  Pt is well nourished male who arrived to PT ambulating slowly, occasional reach out for walls.   -JOLANTA         General ROM    GENERAL ROM COMMENTS  AROM WFL's of all extremities except L hand 80% fist, limited due to arthritis.    -JOLANTA         MMT (Manual Muscle Testing)    Rt Lower Ext  Comments  -JOLANTA      Lt Lower Ext  Comments  -JOLANTA      General MMT Comments  B UE's overall 4+/5 except L handgrip fair -- due to arthritic changes in fingers  -JOLANTA         MMT Right Lower Ext    Rt Lower Extremity Comments   hip flexion 4/5, knee flexion/extension 4+/5, ankle DF and inversion/eversion 4+/5  -JOLANTA         MMT Left Lower Ext    Lt Lower Extremity Comments   hip flexion 4/5, knee flexion/extension and ankle eversion 4+/5, ankle DF and inversion 4-/5  -JOLANTA         Transfers    Sit-Stand [place] (Transfers)  supervision;modified independence to stand with wide YANETH, slowly   -JOLANTA      Stand-Sit [place] (Transfers)  modified independence  -JOLANTA      Transfers, Sit-Stand-Sit, Assist Device  -- no device   -JOLANTA      Transfer, Safety Issues  weight-shifting ability decreased  -JOLANTA      Transfer, Impairments  pain;impaired balance;strength decreased  -JOLANTA      Comment (Transfers)  to stand from armless chair with strong use of UE's.   -JOLANTA         Gait/Stairs (Locomotion)    [place] " Level (Gait)  modified independence;supervision  -JOLANTA      Assistive Device (Gait)  other (see comments) none  -JOLANTA      Distance in Feet (Gait)  80' x 2 with pt c/o back pain  -JOLANTA      Deviations/Abnormal Patterns (Gait)  bilateral deviations;base of support, wide;gait speed decreased;stride length decreased;weight shifting decreased  -JOLANTA      Bilateral Gait Deviations  forward flexed posture;heel strike decreased trunk was rigid   -JOLANTA      [place] Level (Stairs)  modified independence  -JOLANTA      Handrail Location (Stairs)  both sides  -JOLANTA      Number of Steps (Stairs)  4  -JOLANTA      Ascending Technique (Stairs)  step-over-step  -JOLANTA      Descending Technique (Stairs)  step-over-step slower to descend   -JOLANTA      Stairs, Safety Issues  weight-shifting ability decreased  -JOLANTA      Stairs, Impairments  pain;impaired balance;strength decreased  -JOLANTA      Comment (Gait/Stairs)  see Dynamic Gait Index and TUG   -JOLANTA         Balance Skills Training    Balance Comments  see ELIZABETH  -JOLANTA        User Key  (r) = Recorded By, (t) = Taken By, (c) = Cosigned By    Initials Name Provider Type    Magalis Tanner PT Physical Therapist                  Therapy Education  Education Details: Pt was educated on various mobility devices.  Given: Mobility training, Fall prevention and home safety  How Provided: Verbal  Provided to: Patient  Level of Understanding: Teach back education performed, Verbalized    PT OP Goals     Row Name 03/08/21 1400          Long Term Goals    LTG Date to Achieve  04/05/21  -JOLANTA     LTG 1  Pt to be evaluated for appropriate wheeled mobility device to allow pt to be independent in his home environment.   -JOLANTA        Time Calculation    PT Goal Re-Cert Due Date  04/05/21  -JOLANTA       User Key  (r) = Recorded By, (t) = Taken By, (c) = Cosigned By    Initials Name Provider Type    Magalis Tanner PT Physical Therapist          PT Assessment/Plan     Row Name 03/08/21 1651          PT Assessment    Functional  Limitations  Decreased safety during functional activities;Impaired gait;Limitation in home management;Limitations in community activities;Limitations in functional capacity and performance;Performance in leisure activities;Performance in self-care ADL  -JOLANTA     Impairments  Balance;Endurance;Gait;Muscle strength;Pain  -JOLANTA     Assessment Comments  Pt is a 79 y/o male who was referred to PT with diagnosis of generalized osteoarthritis, involving multiple sites along with chronic low back pain for assessment for wheeled mobility. Pt reports he struggles to complete his ADL's due to painful low back limiting his standing time. Pt reports he reaches out to walls or furniture for support at times. Pt struggles some to come to stand due to back pain. Pt has some balance and gait deficits as indicated by outcome measures with the ELIZABETH balance test (45/56), Dynamic Gait Index (18/24) and completed the TUG in 13 seconds. The outcome measures indicate he is at risk for falls. Next treatment session will be utilized as a followup to the initial evaluation to assess pt's ability for wheeled mobility with a durable medical company representative.  -JOLANTA     Please refer to paper survey for additional self-reported information  Yes  -JOLANTA     Rehab Potential  Good  -JOLANTA     Patient/caregiver participated in establishment of treatment plan and goals  Yes  -JOLANTA     Patient would benefit from skilled therapy intervention  Yes  -JOLANTA        PT Plan    PT Frequency  Other (comment)  -JOLANTA     Predicted Duration of Therapy Intervention (PT)  see for one more additional session over the next 4 weeks  -JOLANTA     Planned CPT's?  PT EVAL MOD COMPLELITY: 88283;PT WHEELCHAIR MGMT EA 15 MIN: 08717  -JOLANTA     Physical Therapy Interventions (Optional Details)  wheelchair management/propulsion training  -JOLANTA       User Key  (r) = Recorded By, (t) = Taken By, (c) = Cosigned By    Initials Name Provider Type    Magalis Tanner, PT Physical Therapist        "      OP Exercises     Row Name 03/08/21 0902             Subjective Comments    Subjective Comments  Pt reports his ambulation is limited due to his back pain. He said he feels it has affected his balance as well.   -JOLANTA         Subjective Pain    Able to rate subjective pain?  yes  -JOLANTA      Pre-Treatment Pain Level  2  -JOLANTA      Post-Treatment Pain Level  3  -JOLANTA      Subjective Pain Comment  chronic low back pain   -JOLANTA        User Key  (r) = Recorded By, (t) = Taken By, (c) = Cosigned By    Initials Name Provider Type    Magalis Tanner, PT Physical Therapist                      Outcome Measure Options: Ibanez Balance, Dynamic Gait Index, Timed Up and Go (TUG)  Ibanez Balance Scale  Sitting to Standing: able to stand independently using hands  Standing Unsupported: able to stand safely for 2 minutes (pain increased to \"3\" while standing 2 minutes )  Sitting with Back Unsupported but Feet Supported on Floor or on Stool: able to sit safely and securely for 2 minutes  Standing to Sitting: controls descent by using hands  Transfers: able to transfer safely definite need of hands  Standing Unsupported with Eyes Closed: able to stand 10 seconds safely  Standing Unsupported with Feet Together: able to place feet together independently and stand 1 minute safely  Reaching Forward with Outstretched Arm While Standing: can reach forward 12 cm (5 inches)   Object From the Floor From a Standing Position: able to  object safely and easily  Turning to Look Behind Over Left and Right Shoulders While Standing: looks behind one side only other side shows less weight shift  Turn 360 Degrees: able to turn 360 degrees safely one side only 4 seconds or less  Place Alternate Foot on Step or Stool While Standing Unsupported: able to stand independently and complete 8 steps in > 20 seconds  Standing Unsupported with One Foot in Front: able to place foot ahead independently and hold 30 seconds  Standing on One Leg: tries to " lift leg unable to hold 3 seconds but remains standing independently  Ibanez Total Score: 45  Dynamic Gait Index (DGI)  Gait Level Surface: Mild impairment: walks 20’, uses assistive devices, slower speed, mild gait deviations  Change in Gait Speed: Normal: Able to smoothly change walking speed without loss of balance or gait deviation. Shows significant difference in walking speeds between normal, fast and slow paces.  Gait with Horizontal Head Turns: Mild impairment: Performs head turns smoothly with slight change in gait velocity, i.e. minor disruption to smooth gait path or uses walking aid.  Gait with Vertical Head Turns: Mild impairment: Performs head turns smoothly with slight change in gait velocity, i.e. minor disruption to smooth gait path or uses walking aid.  Gait and Pivot Turn: Normal: Pivot turns safely within 3 seconds and stops quickly with no loss of balance.  Step Over Obstacle: Mild impairment: Is able to step over shoe box, but must slow down and adjust steps to clear box safely.  Step Around Obstacles: Mild impairment: Is able to step around both cones, but must slow down and adjust steps to clear cones.  Steps: Mild impairment: Alternating feet, must use rail.  Dynamic Gait Index Score: 18  Dynamic Gait Index Comments: no device  Timed Up and Go (TUG)  TUG Test 1: 13 seconds (no device)    Time Calculation:   Start Time: 0902  Stop Time: 1002  Time Calculation (min): 60 min  Total Timed Code Minutes- PT: 60 minute(s)   Therapy Charges for Today     Code Description Service Date Service Provider Modifiers Qty    20571092086 HC PT EVAL MOD COMPLEXITY 4 3/8/2021 Magalis Calvo, PT GP 1          PT G-Codes  Outcome Measure Options: Ibanez Balance, Dynamic Gait Index, Timed Up and Go (TUG)  Ibanez Total Score: 45  TUG Test 1: 13 seconds (no device)     Patient was wearing a face mask during this therapy encounter. Therapist used appropriate personal protective equipment including eye protection, mask,  and gloves.  Mask used was standard procedure mask. Appropriate PPE was worn during the entire therapy session. Hand hygiene was completed before and after therapy session. Patient is not in enhanced droplet precautions.         Magalis Calvo, PT  3/8/2021

## 2021-03-15 ENCOUNTER — HOSPITAL ENCOUNTER (OUTPATIENT)
Dept: PHYSICAL THERAPY | Facility: HOSPITAL | Age: 81
Setting detail: THERAPIES SERIES
Discharge: HOME | End: 2021-03-15

## 2021-03-15 DIAGNOSIS — Z46.89 FITTING AND ADJUSTMENT OF WHEELCHAIR: Primary | ICD-10-CM

## 2021-03-15 DIAGNOSIS — M54.50 CHRONIC LOW BACK PAIN WITHOUT SCIATICA, UNSPECIFIED BACK PAIN LATERALITY: ICD-10-CM

## 2021-03-15 DIAGNOSIS — M15.9 GENERALIZED OSTEOARTHROSIS, INVOLVING MULTIPLE SITES: ICD-10-CM

## 2021-03-15 DIAGNOSIS — Z74.09 IMPAIRED FUNCTIONAL MOBILITY, BALANCE, AND ENDURANCE: ICD-10-CM

## 2021-03-15 DIAGNOSIS — G89.29 CHRONIC LOW BACK PAIN WITHOUT SCIATICA, UNSPECIFIED BACK PAIN LATERALITY: ICD-10-CM

## 2021-03-15 PROCEDURE — 97542 WHEELCHAIR MNGMENT TRAINING: CPT

## 2021-03-15 PROCEDURE — 97116 GAIT TRAINING THERAPY: CPT

## 2021-03-15 NOTE — THERAPY DISCHARGE NOTE
Outpatient Physical Therapy Neuro Treatment Note/Discharge Summary  [facility]     Patient Name: Adrian Balderrama  : 1940  MRN: [id number]  Today's Date: 3/15/2021      Visit Date: 03/15/2021    Visit Dx:    ICD-10-CM ICD-9-CM   1. Fitting and adjustment of wheelchair  Z46.89 V53.8   2. Generalized osteoarthrosis, involving multiple sites  M15.9 715.09   3. Chronic low back pain without sciatica, unspecified back pain laterality  M54.5 724.2    G89.29 338.29   4. Impaired functional mobility, balance, and endurance  Z74.09 V49.89       Patient Active Problem List   Diagnosis   • Depression   • RLS (restless legs syndrome)   • Arthritis   • Essential hypertension   • LUANN on CPAP   • Primary osteoarthritis of right knee   • Prostate cancer screening   • S/P hip replacement, left   • Urinary retention   • Malignant neoplasm of descending colon (CMS/HCC)   • erectile dysfunction            PT Neuro     Row Name 03/15/21 0932             Subjective Comments    Subjective Comments  Pt said he does have a rolling walker at home but has not used it. Pt said as long as he has something to lean onto it helps relieve his back pain. His back pain is his most limiting factor of gait distance.   -JOLANTA         Precautions and Contraindications    Precautions/Limitations  fall precautions  -JOLANTA         Subjective Pain    Subjective Pain Comment  chronic low back pain. Did not ask pt to rate his pain but he said he cannot walk very far without back pain increasing and need to reach for wall, etc to relieve pain.   -JOLANTA         Cognition    Overall Cognitive Status  WFL  -JOLANTA      Memory  Appears intact  -JOLANTA      Orientation Level  Oriented X4  -JOLANTA      Safety Judgment  Good awareness of safety precautions  -JOLANTA      Deficits  Fully aware of deficits  -JOLANAT         Posture/Observations    Posture/Observations Comments  Pt arrived to PT ambulating slowly without assistive device.    -JOLANTA         Transfers    Sit-Stand  [place] (Transfers)  modified independence  -JOLANTA      Stand-Sit [place] (Transfers)  modified independence  -JOLANTA      Transfers, Sit-Stand-Sit, Assist Device  other (see comments) to rollator or no device   -JOLANTA      Transfer, Impairments  pain  -JOLANTA      Comment (Transfers)  to stand from armchair.   -JOLANTA         Gait/Stairs (Locomotion)    [place] Level (Gait)  modified independence  -JOLANTA      Assistive Device (Gait)  walker, front-wheeled;other (see comments) no device   -JOLANTA      Distance in Feet (Gait)  60' with Drive C lever rollator; 80' with another type rollator; 70' x 2 no device.   -JOLANTA      Deviations/Abnormal Patterns (Gait)  bilateral deviations;base of support, wide;gait speed decreased;stride length decreased;weight shifting decreased gait speed and more narrow YANETH with rollator   -JOLANTA      Bilateral Gait Deviations  forward flexed posture trunk rigid without device.   -JOLANTA        User Key  (r) = Recorded By, (t) = Taken By, (c) = Cosigned By    Initials Name Provider Type    JOLANTA Magalis Calvo, PT Physical Therapist                  PT Assessment/Plan     Row Name 03/15/21 1040          PT Assessment    Assessment Comments  Based on the PT initial evaluation, history of no falls at home and outcome measures, pt is able to safely ambulate in his home environment without an assistive device. He reports ambulation in the community is limited due to his chronic back pain. BENSON Jaime, a  from [facility] was present today for second PT visit regarding possibility of pt qualifying for a scooter. Mr. Ho felt with based on the initial evaluation, pt did not qualify for insurance to purchase. Therefore, Mr. Ho and PT focused on how pt could purchase a scooter for himself and sites to look for more affordable ones, how to transport scooter too. PT showed and had pt practice using rollator walker and how it could help at home and shorter distances in the community. Pt  thanked PT and Mr. Ho. He has met PT goals at this time and will be d/c.  -JOLANTA       User Key  (r) = Recorded By, (t) = Taken By, (c) = Cosigned By    Initials Name Provider Type    Magalis Tanner PT Physical Therapist               OP Exercises     Row Name 03/15/21 0932             Subjective Comments    Subjective Comments  Pt said he does have a rolling walker at home but has not used it. Pt said as long as he has something to lean onto it helps relieve his back pain. His back pain is his most limiting factor of gait distance.   -JOLANTA         Subjective Pain    Subjective Pain Comment  chronic low back pain. Did not ask pt to rate his pain but he said he cannot walk very far without back pain increasing and need to reach for wall, etc to relieve pain.   -JOLANTA        User Key  (r) = Recorded By, (t) = Taken By, (c) = Cosigned By    Initials Name Provider Type    Magalis Tanner PT Physical Therapist                        PT OP Goals     Row Name 03/15/21 1000          Long Term Goals    LTG 1  Pt to be evaluated for appropriate wheeled mobility device to allow pt to be independent in his home environment.   -JOLANTA     LTG 1 Progress  Met  -JOLANTA       User Key  (r) = Recorded By, (t) = Taken By, (c) = Cosigned By    Initials Name Provider Type    Magalis Tanner PT Physical Therapist          Therapy Education  Education Details: BENSON Jaime, a  from [facility] was present today and discussed with pt why at this point based on his mobility and balance he would not qualify for powered mobility such as a scooter. It was explained to pt what are the qualifications for insurance. Then PT and Mr. Ho discussed with pt he could look into purchasing a scooter for himself possibly look at [employer], [employer], or [employer]. Also told pt he could look at [employer] for getting a lift put on back of truck to transport scooter. Pt even sat in a scooter that is in PT gym to  see what they are like for future reference in purchasing one. Also showed pt and had him practice using a rollator walker -- 2 different types and recommending he look into getting a Drive C Lever rollator and provided him with internet sites to purchase it. Also reviewed how to set the height of the rollator to himself. Pt thanked PT and Mr. Ho for all the information provided to him.  Given: Mobility training, Fall prevention and home safety, Symptoms/condition management  How Provided: Verbal, Demonstration, Written  Provided to: Patient  Level of Understanding: Teach back education performed, Verbalized, Demonstrated            Time Calculation:   Start Time: 0932  Stop Time: 1012  Time Calculation (min): 40 min  Total Timed Code Minutes- PT: 40 minute(s)     Therapy Charges for Today     Code Description Service Date Service Provider Modifiers Qty    73897111006 HC PT WHEELCHAIR MGMT EA 15 MIN 3/15/2021 Magalis Calvo, PT GP 1    91790407169 HC GAIT TRAINING EA 15 MIN 3/15/2021 Magalis Calvo, PT GP 2                OP PT Discharge Summary  Date of Discharge: 03/15/21  Reason for Discharge: All goals achieved  Outcomes Achieved: Able to achieve all goals within established timeline  Discharge Destination: Home without follow-up    Patient was wearing a face mask during this therapy encounter. Therapist used appropriate personal protective equipment including eye protection, mask, and gloves.  Mask used was standard procedure mask. Appropriate PPE was worn during the entire therapy session. Hand hygiene was completed before and after therapy session. Patient is not in enhanced droplet precautions. Francisco Ho from [facility] was present during session.         Magalis Calvo, PT  3/15/2021

## 2021-03-19 ENCOUNTER — TELEPHONE (OUTPATIENT)
Dept: INTERNAL MEDICINE | Facility: CLINIC | Age: 81
End: 2021-03-19

## 2021-03-19 NOTE — TELEPHONE ENCOUNTER
NICOLE [facility] CALLED IN REGARDING A PRESCRIPTION THAT THEY FAXED OVER FOR THE PATIENT TO GET HIS CPAP SUPPLIES,  STATES IT WAS FAXED OVER ON MARCH 16, 2021    PLEASE ADVISE    [phone]

## 2021-04-14 ENCOUNTER — TELEPHONE (OUTPATIENT)
Dept: INTERNAL MEDICINE | Facility: CLINIC | Age: 81
End: 2021-04-14

## 2021-04-14 NOTE — TELEPHONE ENCOUNTER
Caller: [facility]    Relationship: Other    Best call back number: [phone]    What orders are you requesting (i.e. lab or imaging): [facility] WOULD LIKE FOR THE PCP TO SIGN AND DATE A PRESCRIPTION FOR THE CPAP SUPPLIES. THEY STATE THAT THE SETTINGS FOR THE SUPPLIES/MACHINE CAN BE LEFT BLANK. PLEASE ADVISE.    Where will you receive your lab/imaging services: [facility]    Additional notes: A COPY WAS FAXED TO THE OFFICE ON 04/08/2021.

## 2021-04-16 NOTE — TELEPHONE ENCOUNTER
EVY FROM [facility] CALLED STATING DR MORLEY DOES NOT HAVE TO NOTE THE SETTING. EVY ADVISED SHE COULD JUST SIGN AND DATE THE ORDER.

## 2021-04-19 ENCOUNTER — TELEPHONE (OUTPATIENT)
Dept: INTERNAL MEDICINE | Facility: CLINIC | Age: 81
End: 2021-04-19

## 2021-04-19 NOTE — TELEPHONE ENCOUNTER
[facility] RECEIVED AN ORDER FOR CPAP SUPPLIES BUT THE ORDER WAS NOT SIGNED. CAN YOU PLEASE REFAX A SIGNED ORDER TO [facility] -[phone].

## 2021-05-27 ENCOUNTER — TELEPHONE (OUTPATIENT)
Dept: INTERNAL MEDICINE | Facility: CLINIC | Age: 81
End: 2021-05-27

## 2021-05-27 NOTE — TELEPHONE ENCOUNTER
He should only take 4 mg at first onset of diarrhea and then 2 mg prn each loose BM after to max of 16 mg a day- is he needing more than that?

## 2021-05-27 NOTE — TELEPHONE ENCOUNTER
Caller: [place] Adrian    Relationship: Self    Best call back number: [phone]    What medication are you requesting: STRONGER ANTIDIARRHEAL LOPERAMIDE 4MG    What are your current symptoms: DIARRHEA STILL HAPPENING    Have you had these symptoms before:    [x] Yes  [] No    Have you been treated for these symptoms before:   [x] Yes  [] No    If a prescription is needed, what is your preferred pharmacy and phone number: [facility] [address] [facility] AT [facility] [address] [facility] [address]  - [phone]  - [phone] FX     Additional notes: PATIENT IS REQUESTING A STRONGER PRESCRIPTION FOR LOPERAMIDE THAN THE 2MGS. PATIENT SUGGESTED 4 MGS. PLEASE CALL PATIENT BACK.

## 2021-06-01 RX ORDER — LOPERAMIDE HYDROCHLORIDE 2 MG/1
CAPSULE ORAL
Qty: 180 CAPSULE | Refills: 1 | Status: SHIPPED | OUTPATIENT
Start: 2021-06-01 | End: 2021-08-02

## 2021-07-27 DIAGNOSIS — I10 ESSENTIAL HYPERTENSION: ICD-10-CM

## 2021-07-27 RX ORDER — LISINOPRIL 40 MG/1
TABLET ORAL
Qty: 34 TABLET | Refills: 0 | Status: SHIPPED | OUTPATIENT
Start: 2021-07-27 | End: 2021-10-19 | Stop reason: SDUPTHER

## 2021-08-02 RX ORDER — LOPERAMIDE HYDROCHLORIDE 2 MG/1
CAPSULE ORAL
Qty: 180 CAPSULE | Refills: 1 | Status: SHIPPED | OUTPATIENT
Start: 2021-08-02 | End: 2021-09-30

## 2021-08-17 ENCOUNTER — OFFICE VISIT (OUTPATIENT)
Dept: INTERNAL MEDICINE | Facility: CLINIC | Age: 81
End: 2021-08-17

## 2021-08-17 VITALS
SYSTOLIC BLOOD PRESSURE: 128 MMHG | TEMPERATURE: 97.3 F | DIASTOLIC BLOOD PRESSURE: 66 MMHG | WEIGHT: 215 LBS | BODY MASS INDEX: 28.49 KG/M2 | HEIGHT: 73 IN | HEART RATE: 84 BPM

## 2021-08-17 DIAGNOSIS — G89.29 CHRONIC MIDLINE LOW BACK PAIN WITHOUT SCIATICA: Primary | ICD-10-CM

## 2021-08-17 DIAGNOSIS — I10 ESSENTIAL HYPERTENSION: ICD-10-CM

## 2021-08-17 DIAGNOSIS — M54.50 CHRONIC MIDLINE LOW BACK PAIN WITHOUT SCIATICA: Primary | ICD-10-CM

## 2021-08-17 DIAGNOSIS — M19.90 ARTHRITIS: ICD-10-CM

## 2021-08-17 PROCEDURE — 99214 OFFICE O/P EST MOD 30 MIN: CPT | Performed by: INTERNAL MEDICINE

## 2021-08-17 NOTE — PROGRESS NOTES
"Chief Complaint  Hypertension and Joint Swelling (pain)    Subjective          Adrian Balderrama presents to [facility]  History of Present Illness  Complains of diffuse joint pain- he doesn't think the Celebrex is helpful enough.  Tried some meloxicam 15 mg and it didn't help as much as the celebrex does- it's mainly his back issues- has been told that he needs surgery he just has pain when he walks too much.  He is able to work in the yard and be active without much issue.  Has had epidurals in the past without much improvement.  He bought a used scooter that he uses if he's out for an extended period.  Pain stays in the midline, does not radiate.   Saw urologist, normal f/u  Did not have f/u blood work done for increased creatinine.     Objective   Vital Signs:   /66   Pulse 84   Temp 97.3 °F (36.3 °C)   Ht 185.4 cm (73\")   Wt 97.5 kg (215 lb)   BMI 28.37 kg/m²     Physical Exam  Constitutional:       Appearance: Normal appearance.   Cardiovascular:      Rate and Rhythm: Normal rate and regular rhythm.   Musculoskeletal:      Right lower leg: No edema.      Left lower leg: No edema.        Result Review :   The following data was reviewed by: Dior Garsia MD on 08/17/2021:  CMP    CMP 2/4/21   Glucose 98   BUN 34 (A)   Creatinine 1.33 (A)   eGFR Non  Am 52 (A)   eGFR African Am 63   Sodium 138   Potassium 4.0   Chloride 104   Calcium 8.9   Total Protein 6.1   Albumin 4.10   Globulin 2.0   Total Bilirubin 0.6   Alkaline Phosphatase 71   AST (SGOT) 16   ALT (SGPT) 8   (A) Abnormal value       Comments are available for some flowsheets but are not being displayed.                     Assessment and Plan    Diagnoses and all orders for this visit:    1. Chronic midline low back pain without sciatica (Primary)  Comments:  He needs PT before we go any further, does not need pain meds.    Orders:  -     Ambulatory Referral to Physical Therapy Evaluate and treat    2. " Essential hypertension  Comments:  well controlled    3. Arthritis  Comments:  remain on Celebrex- he hasn't responded to others in a long time.  Keep active.         Follow Up   Return in about 6 months (around 2/17/2022) for Medicare Wellness, Lab Today, Lab Before FUP.  Patient was given instructions and counseling regarding his condition or for health maintenance advice. Please see specific information pulled into the AVS if appropriate.

## 2021-08-18 RX ORDER — CELECOXIB 100 MG/1
CAPSULE ORAL
Qty: 180 CAPSULE | Refills: 0 | Status: SHIPPED | OUTPATIENT
Start: 2021-08-18 | End: 2022-01-04

## 2021-09-30 RX ORDER — LOPERAMIDE HYDROCHLORIDE 2 MG/1
CAPSULE ORAL
Qty: 180 CAPSULE | Refills: 1 | Status: SHIPPED | OUTPATIENT
Start: 2021-09-30 | End: 2021-10-19 | Stop reason: SDUPTHER

## 2021-10-19 DIAGNOSIS — I10 ESSENTIAL HYPERTENSION: ICD-10-CM

## 2021-10-19 RX ORDER — LOPERAMIDE HYDROCHLORIDE 2 MG/1
2 CAPSULE ORAL 2 TIMES DAILY PRN
Qty: 180 CAPSULE | Refills: 1 | Status: SHIPPED | OUTPATIENT
Start: 2021-10-19 | End: 2021-12-07 | Stop reason: SDUPTHER

## 2021-10-19 RX ORDER — LISINOPRIL 40 MG/1
40 TABLET ORAL 2 TIMES DAILY
Qty: 34 TABLET | Refills: 0 | Status: SHIPPED | OUTPATIENT
Start: 2021-10-19 | End: 2021-10-25

## 2021-10-19 NOTE — TELEPHONE ENCOUNTER
Caller: Adrian Balderrama    Relationship: Self    Requested Prescriptions:   Requested Prescriptions     Pending Prescriptions Disp Refills   • lisinopril (PRINIVIL,ZESTRIL) 40 MG tablet 34 tablet 0     Sig: Take 1 tablet by mouth 2 (Two) Times a Day.   • loperamide (IMODIUM) 2 MG capsule 180 capsule 1      Pharmacy where request should be sent: [address] AT [facility] [address]  - [phone]  - [phone] FX      Additional details provided by patient: PATIENT NEEDS THESE TO BE SENT A 90 DAY SUPPLY-PATIENT ONLY GOT 34 OF THE LISINOPRIL THE LAST FEW DAYS. PLEASE SEND IN REFILLS.     Best call back number: [phone]    Does the patient have less than a 3 day supply:  [] Yes  [x] No    Ángel Velasquez Rep   10/19/21 15:30 EDT

## 2021-10-25 DIAGNOSIS — I10 ESSENTIAL HYPERTENSION: ICD-10-CM

## 2021-10-25 RX ORDER — HYDROCHLOROTHIAZIDE 12.5 MG/1
TABLET ORAL
Qty: 90 TABLET | Refills: 1 | Status: SHIPPED | OUTPATIENT
Start: 2021-10-25 | End: 2022-06-28

## 2021-10-25 RX ORDER — PRAMIPEXOLE DIHYDROCHLORIDE 0.25 MG/1
TABLET ORAL
Qty: 360 TABLET | Refills: 1 | Status: SHIPPED | OUTPATIENT
Start: 2021-10-25 | End: 2022-06-28

## 2021-10-25 RX ORDER — FLUOXETINE HYDROCHLORIDE 40 MG/1
CAPSULE ORAL
Qty: 90 CAPSULE | Refills: 1 | Status: SHIPPED | OUTPATIENT
Start: 2021-10-25 | End: 2022-06-28

## 2021-10-25 RX ORDER — LISINOPRIL 40 MG/1
TABLET ORAL
Qty: 180 TABLET | Refills: 1 | Status: SHIPPED | OUTPATIENT
Start: 2021-10-25 | End: 2022-07-14

## 2021-11-19 ENCOUNTER — TELEPHONE (OUTPATIENT)
Dept: INTERNAL MEDICINE | Facility: CLINIC | Age: 81
End: 2021-11-19

## 2021-11-19 NOTE — TELEPHONE ENCOUNTER
Pt called stating he is having issues with constipation, he is requesting a call back to see of something can be called in to help.

## 2021-12-01 ENCOUNTER — TELEPHONE (OUTPATIENT)
Dept: INTERNAL MEDICINE | Facility: CLINIC | Age: 81
End: 2021-12-01

## 2021-12-01 NOTE — TELEPHONE ENCOUNTER
Caller: Adrian Balderrama    Relationship to patient: Self    Best call back number:[phone]     Patient is needing: PT IS CALLING IN REGARDS TO THE DOSING OF MEDICATION loperamide (IMODIUM) 2 MG capsule  HE STATED THAT HE USUALLY TAKES 6 A DAY BUT THE MOST RECENT REFILL ON MEDICATION STATED 2CE A DAY. HE WOULD LIKE TO DISCUSS THIS WITH MD MORLEY

## 2021-12-01 NOTE — TELEPHONE ENCOUNTER
He just called a few weeks ago with constipation isssues-  he doesn't need that much imodium and if he does, that needs to be evaluated.  Can offer him appt if he wants  next week.

## 2021-12-03 NOTE — TELEPHONE ENCOUNTER
Caller: Adrian Balderrama    Relationship: Self    Best call back number: [phone]      What is the best time to reach you: ANY TIME    Who are you requesting to speak with (clinical staff, provider,  specific staff member): CLINICAL    What was the call regarding: PATIENT WAS RETURNING CAMDEN'S PHONE CALL REGARDING THIS ISSUE.   PLEASE CALL AND ADVISE.     Do you require a callback: YES

## 2021-12-06 ENCOUNTER — TELEPHONE (OUTPATIENT)
Dept: INTERNAL MEDICINE | Facility: CLINIC | Age: 81
End: 2021-12-06

## 2021-12-06 NOTE — TELEPHONE ENCOUNTER
PT IS REQUESTING A CALL BACK TO CONFIRM HOW MANY OF THE IMODIUM HE IS SUPPOSED TO TAKE, STATES IT WAS SENT IN AS 2 A DAY. SO HE HAS NOT RUN OUT AND THE PHARMACY WILL NOT FILL ANOTHER ONE.

## 2021-12-07 ENCOUNTER — TELEPHONE (OUTPATIENT)
Dept: INTERNAL MEDICINE | Facility: CLINIC | Age: 81
End: 2021-12-07

## 2021-12-07 RX ORDER — LOPERAMIDE HYDROCHLORIDE 2 MG/1
CAPSULE ORAL
Qty: 540 CAPSULE | Refills: 1 | Status: SHIPPED | OUTPATIENT
Start: 2021-12-07 | End: 2022-08-01

## 2021-12-07 NOTE — TELEPHONE ENCOUNTER
Caller: Adrian Balderrama    Relationship: Self    Best call back number:[phone] (H)    What is the best time to reach you: ANYTIME    Who are you requesting to speak with (clinical staff, provider,  specific staff member): CLINICAL STAFF    What was the call regarding: PATIENT RETURNING CAMDEN'S CALL    Do you require a callback: YES

## 2021-12-07 NOTE — TELEPHONE ENCOUNTER
Finally spoke with PT he said that for years he has taken 6 daily was told one time he could take up to 8 with his colon issue.  He said couple of weeks ago when he had constipation, he said that never happens usually.

## 2021-12-07 NOTE — TELEPHONE ENCOUNTER
Caller: Adrian Balderrama    Relationship: Self    Best call back number: [phone]    What is the best time to reach you: ANYTIME    Who are you requesting to speak with (clinical staff, provider,  specific staff member): CAMDEN    Do you know the name of the person who called: CAMDEN    Do you require a callback: YES        PATIENT STATED HE WAS WAITING ON A CALL BACK FROM CAMDEN.  HE CALLED TO LEAVE HIS CELL NUMBER BECAUSE HE WILL BE LEAVING THE HOUSE.

## 2021-12-13 NOTE — TELEPHONE ENCOUNTER
Caller: Adrian Balderrama    Relationship: Self    Best call back number: [phone]     What is the best time to reach you: ANYTIME    Who are you requesting to speak with (clinical staff, provider,  specific staff member): DR MORLEY OR MA    What was the call regarding: PATIENT STATES HE HAS QUESTIONS ABOUT THE AMOUNT OF HIS PRESCRIPTION     Do you require a callback: YES

## 2022-01-04 RX ORDER — CELECOXIB 100 MG/1
CAPSULE ORAL
Qty: 180 CAPSULE | Refills: 0 | Status: SHIPPED | OUTPATIENT
Start: 2022-01-04 | End: 2022-03-11

## 2022-03-11 RX ORDER — CELECOXIB 100 MG/1
CAPSULE ORAL
Qty: 180 CAPSULE | Refills: 0 | Status: SHIPPED | OUTPATIENT
Start: 2022-03-11 | End: 2022-08-29

## 2022-06-28 RX ORDER — HYDROCHLOROTHIAZIDE 12.5 MG/1
TABLET ORAL
Qty: 90 TABLET | Refills: 1 | Status: SHIPPED | OUTPATIENT
Start: 2022-06-28 | End: 2022-12-27

## 2022-06-28 RX ORDER — FLUOXETINE HYDROCHLORIDE 40 MG/1
CAPSULE ORAL
Qty: 90 CAPSULE | Refills: 1 | Status: SHIPPED | OUTPATIENT
Start: 2022-06-28 | End: 2022-12-27

## 2022-06-28 RX ORDER — PRAMIPEXOLE DIHYDROCHLORIDE 0.25 MG/1
TABLET ORAL
Qty: 360 TABLET | Refills: 1 | Status: SHIPPED | OUTPATIENT
Start: 2022-06-28 | End: 2022-12-27

## 2022-06-30 DIAGNOSIS — I10 ESSENTIAL HYPERTENSION: Primary | ICD-10-CM

## 2022-06-30 DIAGNOSIS — Z00.00 HEALTHCARE MAINTENANCE: ICD-10-CM

## 2022-07-01 LAB
ALBUMIN SERPL-MCNC: 4.1 G/DL (ref 3.7–4.7)
ALBUMIN/GLOB SERPL: 1.9 {RATIO} (ref 1.2–2.2)
ALP SERPL-CCNC: 80 IU/L (ref 44–121)
ALT SERPL-CCNC: 9 IU/L (ref 0–44)
AST SERPL-CCNC: 14 IU/L (ref 0–40)
BILIRUB SERPL-MCNC: 0.4 MG/DL (ref 0–1.2)
BUN SERPL-MCNC: 38 MG/DL (ref 8–27)
BUN/CREAT SERPL: 28 (ref 10–24)
CALCIUM SERPL-MCNC: 8.9 MG/DL (ref 8.6–10.2)
CHLORIDE SERPL-SCNC: 104 MMOL/L (ref 96–106)
CHOLEST SERPL-MCNC: 155 MG/DL (ref 100–199)
CHOLEST/HDLC SERPL: 3.9 RATIO (ref 0–5)
CO2 SERPL-SCNC: 22 MMOL/L (ref 20–29)
CREAT SERPL-MCNC: 1.34 MG/DL (ref 0.76–1.27)
EGFRCR SERPLBLD CKD-EPI 2021: 54 ML/MIN/1.73
GLOBULIN SER CALC-MCNC: 2.2 G/DL (ref 1.5–4.5)
GLUCOSE SERPL-MCNC: 86 MG/DL (ref 65–99)
HDLC SERPL-MCNC: 40 MG/DL
LDLC SERPL CALC-MCNC: 96 MG/DL (ref 0–99)
POTASSIUM SERPL-SCNC: 4.2 MMOL/L (ref 3.5–5.2)
PROT SERPL-MCNC: 6.3 G/DL (ref 6–8.5)
SODIUM SERPL-SCNC: 142 MMOL/L (ref 134–144)
TRIGL SERPL-MCNC: 102 MG/DL (ref 0–149)
VLDLC SERPL CALC-MCNC: 19 MG/DL (ref 5–40)

## 2022-07-14 DIAGNOSIS — I10 ESSENTIAL HYPERTENSION: ICD-10-CM

## 2022-07-14 RX ORDER — LISINOPRIL 40 MG/1
TABLET ORAL
Qty: 180 TABLET | Refills: 1 | Status: SHIPPED | OUTPATIENT
Start: 2022-07-14 | End: 2022-12-27

## 2022-08-01 RX ORDER — LOPERAMIDE HYDROCHLORIDE 2 MG/1
CAPSULE ORAL
Qty: 540 CAPSULE | Refills: 1 | Status: SHIPPED | OUTPATIENT
Start: 2022-08-01 | End: 2023-03-01

## 2022-08-29 RX ORDER — CELECOXIB 100 MG/1
CAPSULE ORAL
Qty: 180 CAPSULE | Refills: 0 | Status: SHIPPED | OUTPATIENT
Start: 2022-08-29 | End: 2022-12-27

## 2022-09-07 ENCOUNTER — TELEPHONE (OUTPATIENT)
Dept: INTERNAL MEDICINE | Facility: CLINIC | Age: 82
End: 2022-09-07

## 2022-09-07 RX ORDER — AMLODIPINE BESYLATE 2.5 MG/1
2.5 TABLET ORAL DAILY
Qty: 90 TABLET | Refills: 1 | Status: SHIPPED | OUTPATIENT
Start: 2022-09-07 | End: 2022-11-02 | Stop reason: SDUPTHER

## 2022-09-07 NOTE — TELEPHONE ENCOUNTER
I am sending in an additional, low dose medication for his BP- tell him to keep watching it and f/u as scheduled.  May take a few weeks for the full effect to kick in.

## 2022-09-07 NOTE — TELEPHONE ENCOUNTER
Caller: Adrian Balderrama    Relationship: Self    Best call back number:[phone]    What is the best time to reach you: ANY    Who are you requesting to speak with (clinical staff, provider,  specific staff member): CLINICAL    Do you know the name of the person who called: NONE    What was the call regarding: PATIENT IS CONCERNED IN REGARDS TO AN INCREASE IN HIS BLOOD PRESSURE.  HE NORMALLY SAYS ITS /78  AND IT HAS INCREASED /78.  PLEASE ADVISE    Do you require a callback:YES

## 2022-11-02 ENCOUNTER — OFFICE VISIT (OUTPATIENT)
Dept: INTERNAL MEDICINE | Facility: CLINIC | Age: 82
End: 2022-11-02

## 2022-11-02 VITALS
HEIGHT: 73 IN | BODY MASS INDEX: 29.55 KG/M2 | DIASTOLIC BLOOD PRESSURE: 80 MMHG | SYSTOLIC BLOOD PRESSURE: 144 MMHG | WEIGHT: 223 LBS | TEMPERATURE: 97.3 F | HEART RATE: 80 BPM

## 2022-11-02 DIAGNOSIS — Z00.00 MEDICARE ANNUAL WELLNESS VISIT, SUBSEQUENT: ICD-10-CM

## 2022-11-02 DIAGNOSIS — F32.9 REACTIVE DEPRESSION: ICD-10-CM

## 2022-11-02 DIAGNOSIS — F40.243 FEAR OF FLYING: ICD-10-CM

## 2022-11-02 DIAGNOSIS — I10 ESSENTIAL HYPERTENSION: Primary | ICD-10-CM

## 2022-11-02 PROCEDURE — 1159F MED LIST DOCD IN RCRD: CPT | Performed by: INTERNAL MEDICINE

## 2022-11-02 PROCEDURE — G0439 PPPS, SUBSEQ VISIT: HCPCS | Performed by: INTERNAL MEDICINE

## 2022-11-02 PROCEDURE — 1170F FXNL STATUS ASSESSED: CPT | Performed by: INTERNAL MEDICINE

## 2022-11-02 RX ORDER — AMLODIPINE BESYLATE 5 MG/1
5 TABLET ORAL DAILY
Qty: 90 TABLET | Refills: 1 | Status: SHIPPED | OUTPATIENT
Start: 2022-11-02

## 2022-11-02 RX ORDER — ALPRAZOLAM 0.5 MG/1
0.5 TABLET ORAL DAILY PRN
Qty: 10 TABLET | Refills: 0 | Status: SHIPPED | OUTPATIENT
Start: 2022-11-02

## 2022-11-02 NOTE — PROGRESS NOTES
The ABCs of the Annual Wellness Visit  Subsequent Medicare Wellness Visit    Chief Complaint   Patient presents with   • Medicare Wellness-subsequent      Subjective    History of Present Illness:  Adrian Balderrama is a 82 y.o. male who presents for a Subsequent Medicare Wellness Visit.  Has been checking BP at home- still @ 150/85. Added amlodipine a couple of months ago.  Still uses alprazolam for flying and few other times of anxiety.     The following portions of the patient's history were reviewed and   updated as appropriate: allergies, current medications, past family history, past medical history, past social history, past surgical history and problem list.    Compared to one year ago, the patient feels his physical   health is the same.    Compared to one year ago, the patient feels his mental   health is the same.    Recent Hospitalizations:  He was not admitted to the hospital during the last year.       Current Medical Providers:  Patient Care Team:  Dior Garsia MD as PCP - General (Internal Medicine)  Vj Sanders MD as Consulting Physician (Urology)    Outpatient Medications Prior to Visit   Medication Sig Dispense Refill   • ALPRAZolam (XANAX) 0.5 MG tablet Take 1 tablet by mouth Daily As Needed for Anxiety. Prn flighting. 10 tablet 0   • celecoxib (CeleBREX) 100 MG capsule TAKE ONE CAPSULE BY MOUTH TWICE A  capsule 0   • FLUoxetine (PROzac) 40 MG capsule TAKE ONE CAPSULE BY MOUTH DAILY 90 capsule 1   • fluticasone (Flonase) 50 MCG/ACT nasal spray 2 sprays into the nostril(s) as directed by provider Daily. 18.2 mL 3   • hydroCHLOROthiazide (HYDRODIURIL) 12.5 MG tablet TAKE ONE TABLET BY MOUTH DAILY AS NEEDED 90 tablet 1   • lisinopril (PRINIVIL,ZESTRIL) 40 MG tablet TAKE ONE TABLET BY MOUTH TWICE A  tablet 1   • loperamide (IMODIUM) 2 MG capsule TAKE ONE CAPSULE BY MOUTH UP TO SIX TIMES A  capsule 1   • pramipexole (MIRAPEX) 0.25 MG tablet TAKE 3 TO 4 TABLETS BY MOUTH  "DAILY AS NEEDED 360 tablet 1   • tadalafil (CIALIS) 20 MG tablet Take 1 tablet by mouth Daily As Needed for Erectile Dysfunction. 10 tablet 1   • tamsulosin (FLOMAX) 0.4 MG capsule 24 hr capsule Take two capsules by mouth every evening 180 capsule 0   • amLODIPine (NORVASC) 2.5 MG tablet Take 1 tablet by mouth Daily. 90 tablet 1     No facility-administered medications prior to visit.       No opioid medication identified on active medication list. I have reviewed chart for other potential  high risk medication/s and harmful drug interactions in the elderly.          Aspirin is not on active medication list.  Aspirin use is not indicated based on review of current medical condition/s. Risk of harm outweighs potential benefits.  .    Patient Active Problem List   Diagnosis   • Depression   • RLS (restless legs syndrome)   • Arthritis   • Essential hypertension   • LUANN on CPAP   • Primary osteoarthritis of right knee   • Prostate cancer screening   • S/P hip replacement, left   • Urinary retention   • Malignant neoplasm of descending colon (HCC)   • erectile dysfunction     Advance Care Planning  Advance Directive is not on file.  ACP discussion was held with the patient during this visit. Patient does not have an advance directive, information provided.    Review of Systems   Constitutional: Negative for appetite change.   Eyes: Negative for visual disturbance.   Respiratory: Negative for cough and shortness of breath.    Gastrointestinal: Negative for abdominal pain.   Genitourinary: Negative for difficulty urinating.   Psychiatric/Behavioral: Negative for dysphoric mood.        Objective    Vitals:    11/02/22 1456   BP: 144/80   Pulse: 80   Temp: 97.3 °F (36.3 °C)   Weight: 101 kg (223 lb)   Height: 185.4 cm (73\")     Estimated body mass index is 29.42 kg/m² as calculated from the following:    Height as of this encounter: 185.4 cm (73\").    Weight as of this encounter: 101 kg (223 lb).    BMI is >= 25 and <30. " (Overweight) The following options were offered after discussion;: exercise counseling/recommendations and nutrition counseling/recommendations      Does the patient have evidence of cognitive impairment? No    Physical Exam  Constitutional:       Appearance: Normal appearance.   Cardiovascular:      Rate and Rhythm: Normal rate and regular rhythm.   Pulmonary:      Effort: Pulmonary effort is normal.      Breath sounds: Normal breath sounds.   Musculoskeletal:      Right lower leg: No edema.      Left lower leg: No edema.   Lymphadenopathy:      Cervical: No cervical adenopathy.   Psychiatric:         Mood and Affect: Mood normal.         Thought Content: Thought content normal.                 HEALTH RISK ASSESSMENT    Smoking Status:  Social History     Tobacco Use   Smoking Status Former   Smokeless Tobacco Never     Alcohol Consumption:  Social History     Substance and Sexual Activity   Alcohol Use No     Fall Risk Screen:    [employer] Fall Risk Assessment was completed, and patient is at LOW risk for falls.Assessment completed on:11/2/2022    Depression Screening:  PHQ-2/PHQ-9 Depression Screening 11/2/2022   Retired Total Score -   Little Interest or Pleasure in Doing Things 0-->not at all   Feeling Down, Depressed or Hopeless 0-->not at all   PHQ-9: Brief Depression Severity Measure Score 0       Health Habits and Functional and Cognitive Screening:  Functional & Cognitive Status 11/2/2022   Do you have difficulty preparing food and eating? No   Do you have difficulty bathing yourself, getting dressed or grooming yourself? No   Do you have difficulty using the toilet? No   Do you have difficulty moving around from place to place? No   Do you have trouble with steps or getting out of a bed or a chair? No   Current Diet Well Balanced Diet   Dental Exam Up to date   Eye Exam Not up to date   Exercise (times per week) 0 times per week   Current Exercises Include No Regular Exercise   Current Exercise Activities  Include -   Do you need help using the phone?  No   Are you deaf or do you have serious difficulty hearing?  Yes   Do you need help with transportation? No   Do you need help shopping? No   Do you need help preparing meals?  No   Do you need help with housework?  No   Do you need help with laundry? No   Do you need help taking your medications? No   Do you need help managing money? No   Do you ever drive or ride in a car without wearing a seat belt? No   Have you felt unusual stress, anger or loneliness in the last month? No   Who do you live with? Alone   If you need help, do you have trouble finding someone available to you? No   Have you been bothered in the last four weeks by sexual problems? No   Do you have difficulty concentrating, remembering or making decisions? No       Age-appropriate Screening Schedule:  Refer to the list below for future screening recommendations based on patient's age, sex and/or medical conditions. Orders for these recommended tests are listed in the plan section. The patient has been provided with a written plan.    Health Maintenance   Topic Date Due   • TDAP/TD VACCINES (1 - Tdap) Never done   • ZOSTER VACCINE (2 of 3) 03/08/2011   • INFLUENZA VACCINE  Completed              Assessment & Plan   CMS Preventative Services Quick Reference  Risk Factors Identified During Encounter  Immunizations Discussed/Encouraged (specific Immunizations; Tdap and Shingrix  The above risks/problems have been discussed with the patient.  Follow up actions/plans if indicated are seen below in the Assessment/Plan Section.  Pertinent information has been shared with the patient in the After Visit Summary.    Diagnoses and all orders for this visit:    1. Essential hypertension (Primary)  Comments:  increase amlodipine to 5 mg daily.Monitor BP with goal < 130/80 consistently.   Orders:  -     amLODIPine (NORVASC) 5 MG tablet; Take 1 tablet by mouth Daily.  Dispense: 90 tablet; Refill: 1  -     Basic  Metabolic Panel    2. Fear of flying  Comments:  refill alprazolam for prn use. Refills have been fine.     3. Medicare annual wellness visit, subsequent  Comments:  recommend Shingrix vaccine.  Discussed adding exercise- he is going to get a bicycle. Watch diet, recheck as above.         Follow Up:   Return in about 3 months (around 2/2/2023) for Recheck, Lab Today.     An After Visit Summary and PPPS were made available to the patient.

## 2022-11-03 LAB
BUN SERPL-MCNC: 23 MG/DL (ref 8–23)
BUN/CREAT SERPL: 19.7 (ref 7–25)
CALCIUM SERPL-MCNC: 9.3 MG/DL (ref 8.6–10.5)
CHLORIDE SERPL-SCNC: 103 MMOL/L (ref 98–107)
CO2 SERPL-SCNC: 29.5 MMOL/L (ref 22–29)
CREAT SERPL-MCNC: 1.17 MG/DL (ref 0.76–1.27)
EGFRCR SERPLBLD CKD-EPI 2021: 63 ML/MIN/1.73
GLUCOSE SERPL-MCNC: 104 MG/DL (ref 65–99)
POTASSIUM SERPL-SCNC: 4.2 MMOL/L (ref 3.5–5.2)
SODIUM SERPL-SCNC: 142 MMOL/L (ref 136–145)

## 2022-12-26 DIAGNOSIS — I10 ESSENTIAL HYPERTENSION: ICD-10-CM

## 2022-12-27 RX ORDER — CELECOXIB 100 MG/1
CAPSULE ORAL
Qty: 180 CAPSULE | Refills: 0 | Status: SHIPPED | OUTPATIENT
Start: 2022-12-27

## 2022-12-27 RX ORDER — FLUOXETINE HYDROCHLORIDE 40 MG/1
CAPSULE ORAL
Qty: 90 CAPSULE | Refills: 1 | Status: SHIPPED | OUTPATIENT
Start: 2022-12-27 | End: 2022-12-28

## 2022-12-27 RX ORDER — HYDROCHLOROTHIAZIDE 12.5 MG/1
TABLET ORAL
Qty: 90 TABLET | Refills: 1 | Status: SHIPPED | OUTPATIENT
Start: 2022-12-27 | End: 2022-12-28

## 2022-12-27 RX ORDER — LISINOPRIL 40 MG/1
TABLET ORAL
Qty: 180 TABLET | Refills: 1 | Status: SHIPPED | OUTPATIENT
Start: 2022-12-27 | End: 2022-12-28

## 2022-12-27 RX ORDER — PRAMIPEXOLE DIHYDROCHLORIDE 0.25 MG/1
TABLET ORAL
Qty: 360 TABLET | Refills: 1 | Status: SHIPPED | OUTPATIENT
Start: 2022-12-27 | End: 2022-12-28

## 2022-12-28 DIAGNOSIS — I10 ESSENTIAL HYPERTENSION: ICD-10-CM

## 2022-12-28 RX ORDER — FLUOXETINE HYDROCHLORIDE 40 MG/1
CAPSULE ORAL
Qty: 90 CAPSULE | Refills: 1 | Status: SHIPPED | OUTPATIENT
Start: 2022-12-28

## 2022-12-28 RX ORDER — HYDROCHLOROTHIAZIDE 12.5 MG/1
TABLET ORAL
Qty: 90 TABLET | Refills: 1 | Status: SHIPPED | OUTPATIENT
Start: 2022-12-28

## 2022-12-28 RX ORDER — PRAMIPEXOLE DIHYDROCHLORIDE 0.25 MG/1
TABLET ORAL
Qty: 360 TABLET | Refills: 1 | Status: SHIPPED | OUTPATIENT
Start: 2022-12-28

## 2022-12-28 RX ORDER — LISINOPRIL 40 MG/1
TABLET ORAL
Qty: 180 TABLET | Refills: 1 | Status: SHIPPED | OUTPATIENT
Start: 2022-12-28

## 2023-03-01 ENCOUNTER — OFFICE VISIT (OUTPATIENT)
Dept: INTERNAL MEDICINE | Facility: CLINIC | Age: 83
End: 2023-03-01
Payer: MEDICARE

## 2023-03-01 VITALS
WEIGHT: 206 LBS | BODY MASS INDEX: 27.3 KG/M2 | DIASTOLIC BLOOD PRESSURE: 60 MMHG | HEART RATE: 74 BPM | SYSTOLIC BLOOD PRESSURE: 122 MMHG | HEIGHT: 73 IN

## 2023-03-01 DIAGNOSIS — M19.90 ARTHRITIS: Chronic | ICD-10-CM

## 2023-03-01 DIAGNOSIS — I10 ESSENTIAL HYPERTENSION: Primary | Chronic | ICD-10-CM

## 2023-03-01 PROCEDURE — 99213 OFFICE O/P EST LOW 20 MIN: CPT | Performed by: INTERNAL MEDICINE

## 2023-03-01 RX ORDER — OXYBUTYNIN CHLORIDE 5 MG/1
TABLET, EXTENDED RELEASE ORAL
COMMUNITY
Start: 2022-12-13

## 2023-03-01 NOTE — PROGRESS NOTES
"Chief Complaint  Hypertension    Subjective        Adrian Balderrama presents to [facility]  History of Present Illness weight is down with effort- quit soft drinks and sweets. He feels good. He checks BP on occ - dentist 139. At home is't in the 130s consistently.     Objective   Vital Signs:  /60   Pulse 74   Ht 185.4 cm (73\")   Wt 93.4 kg (206 lb)   BMI 27.18 kg/m²   Estimated body mass index is 27.18 kg/m² as calculated from the following:    Height as of this encounter: 185.4 cm (73\").    Weight as of this encounter: 93.4 kg (206 lb).             Physical Exam  Constitutional:       Appearance: Normal appearance.   Cardiovascular:      Rate and Rhythm: Normal rate and regular rhythm.        Result Review :                   Assessment and Plan   Diagnoses and all orders for this visit:    1. Essential hypertension (Primary)  Comments:  much improved- no change in meds.     2. Arthritis  Comments:  takes Tylenol prn-              Follow Up   Return in about 9 months (around 12/1/2023) for Medicare Wellness, Lab Before FUP.  Patient was given instructions and counseling regarding his condition or for health maintenance advice. Please see specific information pulled into the AVS if appropriate.       " pt comes to ED with dad for a psych eval. when asked about medical problems he states she is crazy. pt refusing to speak in triage. rolling eyes at all questions father dismissive of pt. pt reported to be up to date on vaccinations auscultated hr consistent with v.s machine

## 2023-03-01 NOTE — PROGRESS NOTES
"Chief Complaint  Hypertension    Subjective    {Problem List  Visit Diagnosis   Encounters  Notes  Medications  Labs  Result Review Imaging  Media :23}    Adrian Balderrama presents to [facility]  History of Present Illness    Objective   Vital Signs:  Ht 185.4 cm (73\")   Wt 93.4 kg (206 lb)   BMI 27.18 kg/m²   Estimated body mass index is 27.18 kg/m² as calculated from the following:    Height as of this encounter: 185.4 cm (73\").    Weight as of this encounter: 93.4 kg (206 lb).       {BMI is >= 25 and <30. (Overweight) The following options were offered after discussion; (Optional):90531}      Physical Exam   Result Review :{Labs  Result Review  Imaging  Med Tab  Media  Procedures  :23}  {The following data was reviewed by (Optional):92520}  {Ambulatory Labs (Optional):30926}  {Data reviewed (Optional):60495:::1}             Assessment and Plan {CC Problem List  Visit Diagnosis   ROS  Review (Popup)  Health Maintenance  Quality  BestPractice  Medications  SmartSets  SnapShot Encounters  Media :23}  There are no diagnoses linked to this encounter.       {Time Spent (Optional):39818}  Follow Up {Instructions Charge Capture  Follow-up Communications :23}  No follow-ups on file.  Patient was given instructions and counseling regarding his condition or for health maintenance advice. Please see specific information pulled into the AVS if appropriate.       "

## 2023-04-10 RX ORDER — CELECOXIB 100 MG/1
CAPSULE ORAL
Qty: 180 CAPSULE | Refills: 0 | Status: SHIPPED | OUTPATIENT
Start: 2023-04-10

## 2023-06-02 DIAGNOSIS — I10 ESSENTIAL HYPERTENSION: ICD-10-CM

## 2023-06-02 RX ORDER — AMLODIPINE BESYLATE 5 MG/1
TABLET ORAL
Qty: 90 TABLET | Refills: 1 | Status: SHIPPED | OUTPATIENT
Start: 2023-06-02

## 2023-06-05 RX ORDER — PRAMIPEXOLE DIHYDROCHLORIDE 0.25 MG/1
TABLET ORAL
Qty: 360 TABLET | Refills: 1 | Status: SHIPPED | OUTPATIENT
Start: 2023-06-05

## 2023-06-05 NOTE — TELEPHONE ENCOUNTER
Caller: Adrian Balderrama    Relationship: Self    Best call back number: [phone]    Requested Prescriptions:   Requested Prescriptions     Pending Prescriptions Disp Refills    pramipexole (MIRAPEX) 0.25 MG tablet 360 tablet 1        Pharmacy where request should be sent: [facility] [id number] - [facility] [address] - [phone] PH - [phone] FX     Last office visit with prescribing clinician: 3/1/2023   Last telemedicine visit with prescribing clinician: Visit date not found   Next office visit with prescribing clinician: 12/11/2023     Additional details provided by patient: PT STATES THE PHARMACY SAID TO REQUEST AN INCREASE IN THE DOSAGE. PATIENT WOULD LIKE THIS MEDICATION TO BE INCREASED TO 0.50 MG. PHARMACY SAID THIS DOSE IS WEAK AND PT STATES THE MEDICATION DOESN'T WORK AS WELL.     Does the patient have less than a 3 day supply:  [x] Yes  [] No    Would you like a call back once the refill request has been completed: [x] Yes [] No    If the office needs to give you a call back, can they leave a voicemail: [x] Yes [] No    Ángel Che Rep   06/05/23 11:04 EDT

## 2023-08-03 ENCOUNTER — TELEPHONE (OUTPATIENT)
Dept: INTERNAL MEDICINE | Facility: CLINIC | Age: 83
End: 2023-08-03

## 2023-08-03 NOTE — TELEPHONE ENCOUNTER
Caller: AGUS BATRES    Relationship: Other Relative    Best call back number: [phone]    What is the medical concern/diagnosis: NARROWING IN ESOPHAGUS     What specialty or service is being requested: GASTRO     What is the provider, practice or medical service name: DR. MOSS     What is the office location: [facility]     What is the office phone number: FAX NUMBER: [phone]    Any additional details: PLEASE ADVISE PATIENT'S DAUGHTER WHEN THIS HAS BEEN PUT BACK.

## 2023-08-07 DIAGNOSIS — R13.10 DYSPHAGIA, UNSPECIFIED TYPE: Primary | ICD-10-CM

## 2023-08-07 NOTE — TELEPHONE ENCOUNTER
Hannah states he is having food getting lodged and has to vomit to get it out. States gastro wants to do imaging but they need him to have a referral. States he has been dealing with this for a while and she is unsure why he hasn't mentioned it at his visits.

## 2023-08-16 ENCOUNTER — OFFICE VISIT (OUTPATIENT)
Dept: GASTROENTEROLOGY | Facility: CLINIC | Age: 83
End: 2023-08-16
Payer: MEDICARE

## 2023-08-16 ENCOUNTER — PREP FOR SURGERY (OUTPATIENT)
Dept: SURGERY | Facility: SURGERY CENTER | Age: 83
End: 2023-08-16
Payer: MEDICARE

## 2023-08-16 ENCOUNTER — HOSPITAL ENCOUNTER (OUTPATIENT)
Dept: GENERAL RADIOLOGY | Facility: HOSPITAL | Age: 83
Discharge: HOME | End: 2023-08-16
Payer: MEDICARE

## 2023-08-16 VITALS
TEMPERATURE: 97.7 F | OXYGEN SATURATION: 96 % | WEIGHT: 208.3 LBS | HEART RATE: 61 BPM | HEIGHT: 73 IN | SYSTOLIC BLOOD PRESSURE: 138 MMHG | BODY MASS INDEX: 27.61 KG/M2 | DIASTOLIC BLOOD PRESSURE: 88 MMHG

## 2023-08-16 DIAGNOSIS — R13.19 ESOPHAGEAL DYSPHAGIA: ICD-10-CM

## 2023-08-16 DIAGNOSIS — K21.9 GERD (GASTROESOPHAGEAL REFLUX DISEASE): ICD-10-CM

## 2023-08-16 DIAGNOSIS — R13.19 ESOPHAGEAL DYSPHAGIA: Primary | ICD-10-CM

## 2023-08-16 DIAGNOSIS — R14.0 ABDOMINAL DISTENTION: ICD-10-CM

## 2023-08-16 DIAGNOSIS — R14.0 ABDOMINAL DISTENTION: Primary | ICD-10-CM

## 2023-08-16 PROCEDURE — 74018 RADEX ABDOMEN 1 VIEW: CPT

## 2023-08-16 RX ORDER — HYDROCODONE BITARTRATE AND ACETAMINOPHEN 7.5; 325 MG/1; MG/1
TABLET ORAL EVERY 12 HOURS SCHEDULED
COMMUNITY
Start: 2023-07-31

## 2023-08-16 RX ORDER — SODIUM CHLORIDE 0.9 % (FLUSH) 0.9 %
10 SYRINGE (ML) INJECTION AS NEEDED
Status: CANCELLED | OUTPATIENT
Start: 2023-08-16

## 2023-08-16 RX ORDER — SODIUM CHLORIDE 0.9 % (FLUSH) 0.9 %
3 SYRINGE (ML) INJECTION EVERY 12 HOURS SCHEDULED
Status: CANCELLED | OUTPATIENT
Start: 2023-08-16

## 2023-08-16 RX ORDER — SODIUM CHLORIDE, SODIUM LACTATE, POTASSIUM CHLORIDE, CALCIUM CHLORIDE 600; 310; 30; 20 MG/100ML; MG/100ML; MG/100ML; MG/100ML
30 INJECTION, SOLUTION INTRAVENOUS CONTINUOUS PRN
Status: CANCELLED | OUTPATIENT
Start: 2023-08-16

## 2023-08-16 NOTE — PATIENT INSTRUCTIONS
Schedule EGD for further evaluation, orders placed.    Additional recommendations will be made based on EGD findings.     Scheduling of your Ordered Diagnostic Tests :    A team member from [facility] will contact you within the next 3-5 business days to schedule your tests.  If you have not heard them within this time frame, they can be contacted at [phone]    For GERD:    Follow antireflux precautions:    Avoiding eating within 3 to 4 hours of bedtime.    Avoid foods that can trigger symptoms which may include:  citrus fruits  spicy foods,  Tomatoes  Red sauces   Chocolate  coffee/tea  caffeinated or carbonated beverages  Alcohol

## 2023-08-16 NOTE — PROGRESS NOTES
Chief Complaint   Patient presents with    Difficulty Swallowing           History of Present Illness    Patient is a 83 y.o. who presents to the office for further evaluation of esophageal dysphagia after referral received from primary care provider Dr. Garsia.  Patient has a significant past medical history of heartburn and colon cancer diagnosed in 2004 at [facility] and underwent left hemicolectomy with Dr. Riley at that time- without ostomy.  Additionally, he has a history of prostate cancer treated with both chemo and radiation    Reports heartburn symptoms characterized by upper abdominal pressure sensation and substernal burning sensation after consuming fried foods which is alleviated with over-the-counter Tums.      Denies nausea, vomiting.  Over the past several months he has experienced a worsening esophageal dysphagia most notably with dried foods including breads and dry meats which frequently requires forceful regurgitation of swallowed contents occurring 3-4 times per month.    No unintentional weight loss.  However has made significant lifestyle and dietary modifications to improve health and has decreased body weight by 20 pounds since November 2022.    Denies use of alcohol, former smoker quit at age 28, denies use of NSAIDs.    Bowel habits are described as occurring upwards of 5 times per day however when he is outside his home for an extended period of time he will take up to 4-5 Imodium per day which reduces bowel frequency.  When Imodium is taken he will experience abdominal distention and bloating.    Denies abdominal pain when this occurs.  Most recent colonoscopy was performed in 2007 however these records are not available at this time and patient unsure of endoscopic results.    Patient has positive family history of prostate cancer in father, Hodgkin's and daughter, follicular lymphoma in 2nd daughter.  denies known family history of colon polyps, colon cancer, or IBD.       Result  "Review :       Referral to Gastroenterology for Dysphagia, unspecified type (08/07/2023)       Vital Signs:   /88   Pulse 61   Temp 97.7 øF (36.5 øC)   Ht 185.4 cm (73\")   Wt 94.5 kg (208 lb 4.8 oz)   SpO2 96%   BMI 27.48 kg/mý     Body mass index is 27.48 kg/mý.     Physical Exam  Vitals reviewed.   Constitutional:       General: He is not in acute distress.     Appearance: Normal appearance. He is not ill-appearing.   Eyes:      General: No scleral icterus.  Pulmonary:      Effort: Pulmonary effort is normal. No respiratory distress.   Abdominal:      General: Abdomen is protuberant. Bowel sounds are normal. There is distension.      Palpations: Abdomen is soft. Abdomen is not rigid. There is no mass or pulsatile mass.      Tenderness: There is no abdominal tenderness. There is no guarding or rebound.      Hernia: No hernia is present.      Comments: Increased tympany over left upper quadrant with percussion   Skin:     Coloration: Skin is not jaundiced.   Neurological:      Mental Status: He is alert and oriented to person, place, and time.   Psychiatric:         Thought Content: Thought content normal.         Judgment: Judgment normal.         Assessment and Plan    Diagnoses and all orders for this visit:    1. Abdominal distention (Primary)  -     XR Abdomen KUB; Future    2. Esophageal dysphagia           Discussion:    Patient presents for further evaluation of esophageal dysphagia, this is an undiagnosed issue.  Discussed recommendations to undergo EGD evaluation to further assess esophageal dysphagia and possible esophagitis, gastritis, PUD contributing to symptoms.    While awaiting endoscopic evaluation he will continue with current tums regimen and written instructions provided to patient on common GERD triggers as well as recommendations to reduce risk of dysphagia recurrence.    Also recommend completion of abdominal KUB today to further assess abdominal distention as well as increased " tympany with percussion.  Lengthy discussion completed with both patient and daughter on impact of Imodium on colonic transit and also that our office will request records from [facility] and make additional recommendations for colonoscopy evaluation based on these records.     Patient is agreeable to the outlined above treatment plan.  Verbalizes understanding and will contact office for any new or worsening concerns.  All questions answered and support provided.        Patient Instructions   Schedule EGD for further evaluation, orders placed.    Additional recommendations will be made based on EGD findings.     Scheduling of your Ordered Diagnostic Tests :    A team member from [facility] will contact you within the next 3-5 business days to schedule your tests.  If you have not heard them within this time frame, they can be contacted at [phone]    For GERD:    Follow antireflux precautions:    Avoiding eating within 3 to 4 hours of bedtime.    Avoid foods that can trigger symptoms which may include:  citrus fruits  spicy foods,  Tomatoes  Red sauces   Chocolate  coffee/tea  caffeinated or carbonated beverages  Alcohol          EMR Dragon/Transcription Disclaimer:  This document has been Dictated utilizing Dragon dictation.

## 2023-08-16 NOTE — H&P (VIEW-ONLY)
Chief Complaint   Patient presents with    Difficulty Swallowing           History of Present Illness    Patient is a 83 y.o. who presents to the office for further evaluation of esophageal dysphagia after referral received from primary care provider Dr. Garsia.  Patient has a significant past medical history of heartburn and colon cancer diagnosed in 2004 at [facility] and underwent left hemicolectomy with Dr. Riley at that time- without ostomy.  Additionally, he has a history of prostate cancer treated with both chemo and radiation    Reports heartburn symptoms characterized by upper abdominal pressure sensation and substernal burning sensation after consuming fried foods which is alleviated with over-the-counter Tums.      Denies nausea, vomiting.  Over the past several months he has experienced a worsening esophageal dysphagia most notably with dried foods including breads and dry meats which frequently requires forceful regurgitation of swallowed contents occurring 3-4 times per month.    No unintentional weight loss.  However has made significant lifestyle and dietary modifications to improve health and has decreased body weight by 20 pounds since November 2022.    Denies use of alcohol, former smoker quit at age 28, denies use of NSAIDs.    Bowel habits are described as occurring upwards of 5 times per day however when he is outside his home for an extended period of time he will take up to 4-5 Imodium per day which reduces bowel frequency.  When Imodium is taken he will experience abdominal distention and bloating.    Denies abdominal pain when this occurs.  Most recent colonoscopy was performed in 2007 however these records are not available at this time and patient unsure of endoscopic results.    Patient has positive family history of prostate cancer in father, Hodgkin's and daughter, follicular lymphoma in 2nd daughter.  denies known family history of colon polyps, colon cancer, or IBD.       Result  "Review :       Referral to Gastroenterology for Dysphagia, unspecified type (08/07/2023)       Vital Signs:   /88   Pulse 61   Temp 97.7 øF (36.5 øC)   Ht 185.4 cm (73\")   Wt 94.5 kg (208 lb 4.8 oz)   SpO2 96%   BMI 27.48 kg/mý     Body mass index is 27.48 kg/mý.     Physical Exam  Vitals reviewed.   Constitutional:       General: He is not in acute distress.     Appearance: Normal appearance. He is not ill-appearing.   Eyes:      General: No scleral icterus.  Pulmonary:      Effort: Pulmonary effort is normal. No respiratory distress.   Abdominal:      General: Abdomen is protuberant. Bowel sounds are normal. There is distension.      Palpations: Abdomen is soft. Abdomen is not rigid. There is no mass or pulsatile mass.      Tenderness: There is no abdominal tenderness. There is no guarding or rebound.      Hernia: No hernia is present.      Comments: Increased tympany over left upper quadrant with percussion   Skin:     Coloration: Skin is not jaundiced.   Neurological:      Mental Status: He is alert and oriented to person, place, and time.   Psychiatric:         Thought Content: Thought content normal.         Judgment: Judgment normal.         Assessment and Plan    Diagnoses and all orders for this visit:    1. Abdominal distention (Primary)  -     XR Abdomen KUB; Future    2. Esophageal dysphagia           Discussion:    Patient presents for further evaluation of esophageal dysphagia, this is an undiagnosed issue.  Discussed recommendations to undergo EGD evaluation to further assess esophageal dysphagia and possible esophagitis, gastritis, PUD contributing to symptoms.    While awaiting endoscopic evaluation he will continue with current tums regimen and written instructions provided to patient on common GERD triggers as well as recommendations to reduce risk of dysphagia recurrence.    Also recommend completion of abdominal KUB today to further assess abdominal distention as well as increased " tympany with percussion.  Lengthy discussion completed with both patient and daughter on impact of Imodium on colonic transit and also that our office will request records from [facility] and make additional recommendations for colonoscopy evaluation based on these records.     Patient is agreeable to the outlined above treatment plan.  Verbalizes understanding and will contact office for any new or worsening concerns.  All questions answered and support provided.        Patient Instructions   Schedule EGD for further evaluation, orders placed.    Additional recommendations will be made based on EGD findings.     Scheduling of your Ordered Diagnostic Tests :    A team member from [facility] will contact you within the next 3-5 business days to schedule your tests.  If you have not heard them within this time frame, they can be contacted at [phone]    For GERD:    Follow antireflux precautions:    Avoiding eating within 3 to 4 hours of bedtime.    Avoid foods that can trigger symptoms which may include:  citrus fruits  spicy foods,  Tomatoes  Red sauces   Chocolate  coffee/tea  caffeinated or carbonated beverages  Alcohol          EMR Dragon/Transcription Disclaimer:  This document has been Dictated utilizing Dragon dictation.

## 2023-08-17 ENCOUNTER — HOSPITAL ENCOUNTER (OUTPATIENT)
Facility: SURGERY CENTER | Age: 83
Setting detail: HOSPITAL OUTPATIENT SURGERY
Discharge: HOME | End: 2023-08-17
Attending: INTERNAL MEDICINE | Admitting: INTERNAL MEDICINE
Payer: MEDICARE

## 2023-08-17 ENCOUNTER — ANESTHESIA (OUTPATIENT)
Dept: SURGERY | Facility: SURGERY CENTER | Age: 83
End: 2023-08-17
Payer: MEDICARE

## 2023-08-17 ENCOUNTER — ANESTHESIA EVENT (OUTPATIENT)
Dept: SURGERY | Facility: SURGERY CENTER | Age: 83
End: 2023-08-17
Payer: MEDICARE

## 2023-08-17 VITALS
BODY MASS INDEX: 27.57 KG/M2 | OXYGEN SATURATION: 96 % | RESPIRATION RATE: 20 BRPM | DIASTOLIC BLOOD PRESSURE: 84 MMHG | SYSTOLIC BLOOD PRESSURE: 140 MMHG | WEIGHT: 209 LBS | HEART RATE: 52 BPM | TEMPERATURE: 97.8 F

## 2023-08-17 DIAGNOSIS — R13.19 ESOPHAGEAL DYSPHAGIA: ICD-10-CM

## 2023-08-17 DIAGNOSIS — K21.9 GERD (GASTROESOPHAGEAL REFLUX DISEASE): ICD-10-CM

## 2023-08-17 PROCEDURE — 43249 ESOPH EGD DILATION <30 MM: CPT | Performed by: INTERNAL MEDICINE

## 2023-08-17 PROCEDURE — 88305 TISSUE EXAM BY PATHOLOGIST: CPT | Performed by: INTERNAL MEDICINE

## 2023-08-17 PROCEDURE — 0 LIDOCAINE 1 % SOLUTION: Performed by: ANESTHESIOLOGY

## 2023-08-17 PROCEDURE — 25010000002 PROPOFOL 10 MG/ML EMULSION: Performed by: ANESTHESIOLOGY

## 2023-08-17 PROCEDURE — C1726 CATH, BAL DIL, NON-VASCULAR: HCPCS | Performed by: INTERNAL MEDICINE

## 2023-08-17 PROCEDURE — 43239 EGD BIOPSY SINGLE/MULTIPLE: CPT | Performed by: INTERNAL MEDICINE

## 2023-08-17 RX ORDER — SODIUM CHLORIDE, SODIUM LACTATE, POTASSIUM CHLORIDE, CALCIUM CHLORIDE 600; 310; 30; 20 MG/100ML; MG/100ML; MG/100ML; MG/100ML
30 INJECTION, SOLUTION INTRAVENOUS CONTINUOUS PRN
Status: DISCONTINUED | OUTPATIENT
Start: 2023-08-17 | End: 2023-08-17 | Stop reason: HOSPADM

## 2023-08-17 RX ORDER — SODIUM CHLORIDE 0.9 % (FLUSH) 0.9 %
3 SYRINGE (ML) INJECTION EVERY 12 HOURS SCHEDULED
Status: DISCONTINUED | OUTPATIENT
Start: 2023-08-17 | End: 2023-08-17 | Stop reason: HOSPADM

## 2023-08-17 RX ORDER — LIDOCAINE HYDROCHLORIDE 10 MG/ML
INJECTION, SOLUTION INFILTRATION; PERINEURAL AS NEEDED
Status: DISCONTINUED | OUTPATIENT
Start: 2023-08-17 | End: 2023-08-17 | Stop reason: SURG

## 2023-08-17 RX ORDER — SODIUM CHLORIDE 0.9 % (FLUSH) 0.9 %
10 SYRINGE (ML) INJECTION AS NEEDED
Status: DISCONTINUED | OUTPATIENT
Start: 2023-08-17 | End: 2023-08-17 | Stop reason: HOSPADM

## 2023-08-17 RX ADMIN — SODIUM CHLORIDE, POTASSIUM CHLORIDE, SODIUM LACTATE AND CALCIUM CHLORIDE 30 ML/HR: 600; 310; 30; 20 INJECTION, SOLUTION INTRAVENOUS at 10:49

## 2023-08-17 RX ADMIN — PROPOFOL INJECTABLE EMULSION 200 MCG/KG/MIN: 10 INJECTION, EMULSION INTRAVENOUS at 11:20

## 2023-08-17 RX ADMIN — LIDOCAINE HYDROCHLORIDE 60 MG: 10 INJECTION, SOLUTION INFILTRATION; PERINEURAL at 11:20

## 2023-08-17 NOTE — ANESTHESIA PREPROCEDURE EVALUATION
Anesthesia Evaluation     NPO Solid Status: > 8 hours  NPO Liquid Status: > 8 hours           Airway   Mallampati: I  No difficulty expected  Dental      Pulmonary    (+) ,sleep apnea  Cardiovascular   Exercise tolerance: good (4-7 METS)    (+) hypertension      Neuro/Psych  (+) psychiatric history  GI/Hepatic/Renal/Endo    (+) GERD    Musculoskeletal     Abdominal    Substance History      OB/GYN          Other   arthritis,   history of cancer                  Anesthesia Plan    ASA 3     MAC     intravenous induction     Anesthetic plan, risks, benefits, and alternatives have been provided, discussed and informed consent has been obtained with: patient.    CODE STATUS:

## 2023-08-17 NOTE — ANESTHESIA POSTPROCEDURE EVALUATION
Patient: Adrian Balderrama    Procedure Summary       Date: 08/17/23 Room / Location: [facility] OR 06 / [facility]    Anesthesia Start: 1117 Anesthesia Stop: 1132    Procedure: ESOPHAGOGASTRODUODENOSCOPY with 12-15mm Ballon Dilation Diagnosis:       Esophageal dysphagia      GERD (gastroesophageal reflux disease)      (Esophageal dysphagia [R13.19])      (GERD (gastroesophageal reflux disease) [K21.9])    Surgeons: Sukhjinder Zheng MD Provider: Rupesh Villarreal MD    Anesthesia Type: MAC ASA Status: 3            Anesthesia Type: MAC    Vitals  Vitals Value Taken Time   /84 08/17/23 1156   Temp 36.6 øC (97.8 øF) 08/17/23 1135   Pulse 50 08/17/23 1156   Resp 20 08/17/23 1155   SpO2 97 % 08/17/23 1156   Vitals shown include unvalidated device data.        Post Anesthesia Care and Evaluation    Patient location during evaluation: PACU  Patient participation: complete - patient participated  Level of consciousness: awake  Pain management: adequate    Airway patency: patent  Anesthetic complications: No anesthetic complications  PONV Status: none  Cardiovascular status: acceptable  Respiratory status: acceptable  Hydration status: acceptable    Comments: Patient seen and examined postoperatively; vital signs stable; SpO2 greater than or equal to 90%; cardiopulmonary status stable; nausea/vomiting adequately controlled; pain adequately controlled; no apparent anesthesia complications; patient discharged from anesthesia care when discharge criteria were met

## 2023-08-18 LAB
LAB AP CASE REPORT: NORMAL
PATH REPORT.FINAL DX SPEC: NORMAL
PATH REPORT.GROSS SPEC: NORMAL

## 2023-08-21 ENCOUNTER — TELEPHONE (OUTPATIENT)
Dept: GASTROENTEROLOGY | Facility: CLINIC | Age: 83
End: 2023-08-21
Payer: MEDICARE

## 2023-08-21 DIAGNOSIS — K21.00 GASTROESOPHAGEAL REFLUX DISEASE WITH ESOPHAGITIS WITHOUT HEMORRHAGE: Primary | ICD-10-CM

## 2023-08-21 NOTE — TELEPHONE ENCOUNTER
Patient left a Voice Message requesting a call back with last scope test results.    [phone]

## 2023-08-22 RX ORDER — PANTOPRAZOLE SODIUM 40 MG/1
40 TABLET, DELAYED RELEASE ORAL DAILY
Qty: 90 TABLET | Refills: 3 | Status: SHIPPED | OUTPATIENT
Start: 2023-08-22

## 2023-08-22 NOTE — TELEPHONE ENCOUNTER
Patient notified a prescription for pantoprazole sent to patient's pharmacy.  He is agreeable to taking it once daily prior to breakfast.  He will contact her office for any new or worsening GI concerns.    ISREAL Mcdaniel

## 2023-08-22 NOTE — TELEPHONE ENCOUNTER
RN called and spoke with patient regarding results. Pt verbalized understanding. Dr. Zheng recommended daily PPI, please advise on which PPI. Pharmacy verified. EL

## 2023-09-07 RX ORDER — LOPERAMIDE HYDROCHLORIDE 2 MG/1
CAPSULE ORAL
Qty: 540 CAPSULE | OUTPATIENT
Start: 2023-09-07

## 2023-09-14 RX ORDER — LOPERAMIDE HYDROCHLORIDE 2 MG/1
CAPSULE ORAL
Qty: 540 CAPSULE | OUTPATIENT
Start: 2023-09-14

## 2023-10-02 RX ORDER — CELECOXIB 100 MG/1
CAPSULE ORAL
Qty: 180 CAPSULE | Refills: 0 | Status: SHIPPED | OUTPATIENT
Start: 2023-10-02

## 2023-10-10 ENCOUNTER — TELEPHONE (OUTPATIENT)
Dept: GASTROENTEROLOGY | Facility: CLINIC | Age: 83
End: 2023-10-10
Payer: MEDICARE

## 2023-10-11 NOTE — TELEPHONE ENCOUNTER
Please schedule patient for an office evaluation due to experiencing adverse effects with use of loperamide at last in office visit and for follow-up after endoscopic evaluation.    Irais Sparrow, APRN

## 2023-10-12 RX ORDER — LOPERAMIDE HYDROCHLORIDE 2 MG/1
2 TABLET ORAL 4 TIMES DAILY PRN
Qty: 90 TABLET | Refills: 3 | Status: SHIPPED | OUTPATIENT
Start: 2023-10-12

## 2023-10-12 NOTE — TELEPHONE ENCOUNTER
"Informed patient of 's message regarding loperamide and needing an office visit.  Patient states that \"there must have been a misunderstanding, because he's never had an issue with loperamide\".  Patient requesting again to have a refill sent to pharmacy.    "

## 2023-10-12 NOTE — TELEPHONE ENCOUNTER
Patient notified via telephone that prescription for Imodium had been sent inTo his pharmacy to take 4 times daily as needed for diarrhea.    Reports that abdominal distention noted on most recent in office evaluation has not recurred since this time with bowel habits occurring daily without visible melena or hematochezia.    Reviewed recommendations to proceed with colonoscopy evaluation due to history of colon cancer per Dr. Zheng and I  review of previous colonoscopy records from Dr. Riley    Patient continues to decline colonoscopy for colon cancer screening at this time stating that he would like to think about options and would contact our office if he would like to proceed.    Verbalizes understanding of risk associated with foregoing future colonoscopy for colon cancer screening including a recurrence and malignancy and or a precancerous polyp.    All questions answered and support provided.    ISREAL Mcdaniel

## 2023-11-26 DIAGNOSIS — I10 ESSENTIAL HYPERTENSION: ICD-10-CM

## 2023-11-27 RX ORDER — AMLODIPINE BESYLATE 5 MG/1
5 TABLET ORAL DAILY
Qty: 30 TABLET | Refills: 1 | Status: SHIPPED | OUTPATIENT
Start: 2023-11-27

## 2023-12-01 RX ORDER — LOPERAMIDE HYDROCHLORIDE 2 MG/1
CAPSULE ORAL
Qty: 540 CAPSULE | Refills: 1 | OUTPATIENT
Start: 2023-12-01

## 2023-12-13 ENCOUNTER — TELEPHONE (OUTPATIENT)
Dept: INTERNAL MEDICINE | Facility: CLINIC | Age: 83
End: 2023-12-13
Payer: MEDICARE

## 2023-12-13 NOTE — TELEPHONE ENCOUNTER
It was filled in October by GI- needs to pick who is going to refill it  Cancelled his December appt and r/s to April which is more than a year since his last visit.  He is not an annual visit pt.  Needs [facility] and labs in [place]

## 2023-12-13 NOTE — TELEPHONE ENCOUNTER
Patient called trying to figure out why Loperamide has been denied call back number is [phone]. Patient said he has been taking for 19 years

## 2023-12-20 ENCOUNTER — TELEPHONE (OUTPATIENT)
Dept: INTERNAL MEDICINE | Facility: CLINIC | Age: 83
End: 2023-12-20
Payer: MEDICARE

## 2023-12-20 RX ORDER — LOPERAMIDE HYDROCHLORIDE 2 MG/1
2 TABLET ORAL 4 TIMES DAILY PRN
Qty: 90 TABLET | Refills: 3 | Status: SHIPPED | OUTPATIENT
Start: 2023-12-20

## 2023-12-20 NOTE — TELEPHONE ENCOUNTER
Patient called back and scheduled his Medicare wellness with labs for January 2024 and is wanting to know if Dr. Garsia is going to call in his prescription now that he has an appointment, patient apologized for canceling his appointment in December, something came up and he didn't have a choice, please advise?

## 2023-12-27 DIAGNOSIS — I10 ESSENTIAL HYPERTENSION: ICD-10-CM

## 2023-12-27 RX ORDER — HYDROCHLOROTHIAZIDE 12.5 MG/1
TABLET ORAL
Qty: 30 TABLET | Refills: 0 | Status: SHIPPED | OUTPATIENT
Start: 2023-12-27

## 2023-12-27 RX ORDER — FLUOXETINE HYDROCHLORIDE 40 MG/1
CAPSULE ORAL
Qty: 30 CAPSULE | Refills: 0 | Status: SHIPPED | OUTPATIENT
Start: 2023-12-27

## 2023-12-27 RX ORDER — LISINOPRIL 40 MG/1
TABLET ORAL
Qty: 60 TABLET | Refills: 0 | Status: SHIPPED | OUTPATIENT
Start: 2023-12-27

## 2024-01-02 RX ORDER — CELECOXIB 100 MG/1
CAPSULE ORAL
Qty: 60 CAPSULE | Refills: 0 | Status: SHIPPED | OUTPATIENT
Start: 2024-01-02 | End: 2024-01-08

## 2024-01-04 ENCOUNTER — LAB (OUTPATIENT)
Facility: HOSPITAL | Age: 84
End: 2024-01-04
Payer: COMMERCIAL

## 2024-01-04 PROCEDURE — 80053 COMPREHEN METABOLIC PANEL: CPT | Performed by: INTERNAL MEDICINE

## 2024-01-04 PROCEDURE — 80061 LIPID PANEL: CPT | Performed by: INTERNAL MEDICINE

## 2024-01-08 ENCOUNTER — OFFICE VISIT (OUTPATIENT)
Dept: INTERNAL MEDICINE | Facility: CLINIC | Age: 84
End: 2024-01-08
Payer: MEDICARE

## 2024-01-08 VITALS
HEART RATE: 78 BPM | DIASTOLIC BLOOD PRESSURE: 76 MMHG | HEIGHT: 73 IN | SYSTOLIC BLOOD PRESSURE: 148 MMHG | WEIGHT: 213 LBS | BODY MASS INDEX: 28.23 KG/M2

## 2024-01-08 DIAGNOSIS — I10 ESSENTIAL HYPERTENSION: Primary | Chronic | ICD-10-CM

## 2024-01-08 DIAGNOSIS — F32.9 REACTIVE DEPRESSION: ICD-10-CM

## 2024-01-08 DIAGNOSIS — F40.243 FEAR OF FLYING: ICD-10-CM

## 2024-01-08 DIAGNOSIS — M19.90 ARTHRITIS: Chronic | ICD-10-CM

## 2024-01-08 DIAGNOSIS — G47.33 OSA ON CPAP: Chronic | ICD-10-CM

## 2024-01-08 DIAGNOSIS — Z00.00 MEDICARE ANNUAL WELLNESS VISIT, SUBSEQUENT: ICD-10-CM

## 2024-01-08 PROBLEM — R14.0 ABDOMINAL DISTENTION: Status: RESOLVED | Noted: 2023-08-16 | Resolved: 2024-01-08

## 2024-01-08 PROCEDURE — 3077F SYST BP >= 140 MM HG: CPT | Performed by: INTERNAL MEDICINE

## 2024-01-08 PROCEDURE — 3078F DIAST BP <80 MM HG: CPT | Performed by: INTERNAL MEDICINE

## 2024-01-08 PROCEDURE — 1170F FXNL STATUS ASSESSED: CPT | Performed by: INTERNAL MEDICINE

## 2024-01-08 PROCEDURE — 99214 OFFICE O/P EST MOD 30 MIN: CPT | Performed by: INTERNAL MEDICINE

## 2024-01-08 PROCEDURE — G0439 PPPS, SUBSEQ VISIT: HCPCS | Performed by: INTERNAL MEDICINE

## 2024-01-08 PROCEDURE — 1159F MED LIST DOCD IN RCRD: CPT | Performed by: INTERNAL MEDICINE

## 2024-01-08 PROCEDURE — 1160F RVW MEDS BY RX/DR IN RCRD: CPT | Performed by: INTERNAL MEDICINE

## 2024-01-08 RX ORDER — ALPRAZOLAM 0.5 MG/1
0.5 TABLET ORAL DAILY PRN
Qty: 10 TABLET | Refills: 0 | Status: SHIPPED | OUTPATIENT
Start: 2024-01-08

## 2024-01-08 RX ORDER — LISINOPRIL 20 MG/1
20 TABLET ORAL DAILY
Qty: 90 TABLET | Refills: 1 | Status: SHIPPED | OUTPATIENT
Start: 2024-01-08

## 2024-01-08 NOTE — PROGRESS NOTES
The ABCs of the Annual Wellness Visit  Subsequent Medicare Wellness Visit    Subjective    Adrian Balderrama is a 83 y.o. male who presents for a Subsequent Medicare Wellness Visit.    The following portions of the patient's history were reviewed and   updated as appropriate: allergies, current medications, past family history, past medical history, past social history, past surgical history, and problem list.    Compared to one year ago, the patient feels his physical   health is the same.    Compared to one year ago, the patient feels his mental   health is the same.    Recent Hospitalizations:  He was not admitted to the hospital during the last year.       Current Medical Providers:  Patient Care Team:  Dior Garsia MD as PCP - General (Internal Medicine)  Vj Sanders MD as Consulting Physician (Urology)    Outpatient Medications Prior to Visit   Medication Sig Dispense Refill    amLODIPine (NORVASC) 5 MG tablet TAKE 1 TABLET BY MOUTH DAILY 30 tablet 1    FLUoxetine (PROzac) 40 MG capsule TAKE ONE CAPSULE BY MOUTH DAILY 30 capsule 0    hydroCHLOROthiazide (HYDRODIURIL) 12.5 MG tablet TAKE ONE TABLET BY MOUTH DAILY AS NEEDED 30 tablet 0    HYDROcodone-acetaminophen (NORCO) 7.5-325 MG per tablet Every 12 (Twelve) Hours.      lisinopril (PRINIVIL,ZESTRIL) 40 MG tablet TAKE ONE TABLET BY MOUTH TWICE A DAY 60 tablet 0    loperamide (Imodium A-D) 2 MG tablet Take 1 tablet by mouth 4 (Four) Times a Day As Needed for Diarrhea. 90 tablet 3    oxybutynin XL (DITROPAN-XL) 5 MG 24 hr tablet       pantoprazole (PROTONIX) 40 MG EC tablet Take 1 tablet by mouth Daily. 90 tablet 3    pramipexole (MIRAPEX) 0.25 MG tablet Take 3-4 tablets by mouth daily as needed 360 tablet 1    ALPRAZolam (XANAX) 0.5 MG tablet Take 1 tablet by mouth Daily As Needed for Anxiety. Prn flighting. 10 tablet 0    celecoxib (CeleBREX) 100 MG capsule TAKE 1 CAPSULE BY MOUTH TWICE A DAY 60 capsule 0    fluticasone (Flonase) 50 MCG/ACT nasal  "spray 2 sprays into the nostril(s) as directed by provider Daily. 18.2 mL 3    tamsulosin (FLOMAX) 0.4 MG capsule 24 hr capsule Take two capsules by mouth every evening 180 capsule 0    tadalafil (CIALIS) 20 MG tablet Take 1 tablet by mouth Daily As Needed for Erectile Dysfunction. (Patient not taking: Reported on 1/8/2024) 10 tablet 1     No facility-administered medications prior to visit.       Opioid medication/s are on active medication list.  and I have evaluated his active treatment plan and pain score trends (see table).  There were no vitals filed for this visit.  I have reviewed the chart for potential of high risk medication and harmful drug interactions in the elderly.          Aspirin is not on active medication list.  Aspirin use is not indicated based on review of current medical condition/s. Risk of harm outweighs potential benefits.  .    Patient Active Problem List   Diagnosis    Depression    RLS (restless legs syndrome)    Arthritis    Essential hypertension    LUANN on CPAP    Primary osteoarthritis of right knee    Prostate cancer screening    S/P hip replacement, left    Urinary retention    Malignant neoplasm of descending colon    erectile dysfunction    Esophageal dysphagia    GERD (gastroesophageal reflux disease)     Advance Care Planning   Advance Care Planning     Advance Directive is not on file.  ACP discussion was held with the patient during this visit. Patient does not have an advance directive, information provided.     Objective    Vitals:    01/08/24 1255   BP: 148/76   Pulse: 78   Weight: 96.6 kg (213 lb)   Height: 185.4 cm (73\")     Estimated body mass index is 28.1 kg/m² as calculated from the following:    Height as of this encounter: 185.4 cm (73\").    Weight as of this encounter: 96.6 kg (213 lb).    BMI is >= 25 and <30. (Overweight) The following options were offered after discussion;: exercise counseling/recommendations and nutrition counseling/recommendations      Does " the patient have evidence of cognitive impairment? No    Lab Results   Component Value Date    TRIG 43 2024    HDL 48 2024    LDL 86 2024    VLDL 10 2024        HEALTH RISK ASSESSMENT    Smoking Status:  Social History     Tobacco Use   Smoking Status Former   Smokeless Tobacco Never     Alcohol Consumption:  Social History     Substance and Sexual Activity   Alcohol Use No     Fall Risk Screen:    LOUISEMILES Fall Risk Assessment was completed, and patient is at LOW risk for falls.Assessment completed on:2024    Depression Screenin/8/2024    12:58 PM   PHQ-2/PHQ-9 Depression Screening   Little Interest or Pleasure in Doing Things 0-->not at all   Feeling Down, Depressed or Hopeless 0-->not at all   PHQ-9: Brief Depression Severity Measure Score 0       Health Habits and Functional and Cognitive Screenin/8/2024    12:57 PM   Functional & Cognitive Status   Do you have difficulty preparing food and eating? No   Do you have difficulty bathing yourself, getting dressed or grooming yourself? No   Do you have difficulty using the toilet? No   Do you have difficulty moving around from place to place? No   Do you have trouble with steps or getting out of a bed or a chair? No   Current Diet Well Balanced Diet   Dental Exam Up to date   Eye Exam Up to date   Exercise (times per week) 0 times per week   Current Exercises Include No Regular Exercise   Do you need help using the phone?  No   Are you deaf or do you have serious difficulty hearing?  No   Do you need help to go to places out of walking distance? No   Do you need help shopping? No   Do you need help preparing meals?  No   Do you need help with housework?  No   Do you need help with laundry? No   Do you need help taking your medications? No   Do you need help managing money? No   Do you ever drive or ride in a car without wearing a seat belt? No   Have you felt unusual stress, anger or loneliness in the last month? No   Who  do you live with? Alone   If you need help, do you have trouble finding someone available to you? No   Have you been bothered in the last four weeks by sexual problems? No   Do you have difficulty concentrating, remembering or making decisions? No       Age-appropriate Screening Schedule:  Refer to the list below for future screening recommendations based on patient's age, sex and/or medical conditions. Orders for these recommended tests are listed in the plan section. The patient has been provided with a written plan.    Health Maintenance   Topic Date Due    TDAP/TD VACCINES (1 - Tdap) Never done    ZOSTER VACCINE (2 of 3) 03/08/2011    ANNUAL WELLNESS VISIT  01/08/2025    BMI FOLLOWUP  01/08/2025    COVID-19 Vaccine  Completed    INFLUENZA VACCINE  Completed    Pneumococcal Vaccine 65+  Completed                  CMS Preventative Services Quick Reference  Risk Factors Identified During Encounter  Immunizations Discussed/Encouraged: Shingrix  The above risks/problems have been discussed with the patient.  Pertinent information has been shared with the patient in the After Visit Summary.  An After Visit Summary and PPPS were made available to the patient.    Follow Up:   Next Medicare Wellness visit to be scheduled in 1 year.       Additional E&M Note during same encounter follows:  Patient has multiple medical problems which are significant and separately identifiable that require additional work above and beyond the Medicare Wellness Visit.      Chief Complaint  Medicare Wellness-subsequent    Subjective        HPI  Adrian Balderrama is also being seen today for BP- has been @ 145 at home.  Feels fine.   He doesn't feel like the generic celebrex hasn't been as helpful- wants name brand.  Went to [facility]- sent him to pain management- has had a couple of injections that hasn't helped.  He is getting hydrocodone from them- he is taking about 1 some days.   Had EGD with dilation in August, helped a lot.  "  Imodium up to 4 a day, depending on his activity- since his surgery almost 20 years ago.   Using CPAP each night.   Has been riding stationary bicycle regularly- feels good.        Objective   Vital Signs:  /76   Pulse 78   Ht 185.4 cm (73\")   Wt 96.6 kg (213 lb)   BMI 28.10 kg/m²     Physical Exam  Constitutional:       Appearance: Normal appearance.   Cardiovascular:      Rate and Rhythm: Normal rate and regular rhythm.   Pulmonary:      Effort: Pulmonary effort is normal.   Musculoskeletal:      Right lower leg: No edema.      Left lower leg: No edema.      Body mass index is 28.1 kg/m².  BMI is >= 25 and <30. (Overweight) The following options were offered after discussion;: exercise counseling/recommendations and nutrition counseling/recommendations      The following data was reviewed by: Dior Garsia MD on 01/08/2024:  Common labs          1/4/2024    08:41   Common Labs   Glucose 98    BUN 25    Creatinine 1.33    Sodium 140    Potassium 4.1    Chloride 103    Calcium 8.9    Albumin 4.2    Total Bilirubin 0.4    Alkaline Phosphatase 69    AST (SGOT) 17    ALT (SGPT) 10    Total Cholesterol 144    Triglycerides 43    HDL Cholesterol 48    LDL Cholesterol  86      Data reviewed : Consultant notes neurosurgery and pain management           Assessment and Plan   Diagnoses and all orders for this visit:    1. Essential hypertension (Primary)  Comments:  add lisinopril 20 mg daily- 60 mg total  Orders:  -     lisinopril (PRINIVIL,ZESTRIL) 20 MG tablet; Take 1 tablet by mouth Daily.  Dispense: 90 tablet; Refill: 1    2. LUANN on CPAP  Comments:  cont nightly use.    3. Reactive depression  Comments:  stable    4. Fear of flying  Comments:  refill alprazolam- watch refills.  Orders:  -     ALPRAZolam (XANAX) 0.5 MG tablet; Take 1 tablet by mouth Daily As Needed for Anxiety. Prn flighting.  Dispense: 10 tablet; Refill: 0    5. Medicare annual wellness visit, subsequent  Comments:  reminded about Shingrix " vaccine- remains very active- would work on walking more for exercise to work on balance    6. Arthritis  Comments:  wants to try name brand Celebrex- helped him more in the past.  Orders:  -     CeleBREX 100 MG capsule; Take 1 capsule by mouth 2 (Two) Times a Day As Needed for Mild Pain.  Dispense: 180 capsule; Refill: 0             Follow Up   Return in about 6 months (around 7/8/2024) for Recheck.  Patient was given instructions and counseling regarding his condition or for health maintenance advice. Please see specific information pulled into the AVS if appropriate.

## 2024-01-10 ENCOUNTER — TELEPHONE (OUTPATIENT)
Dept: INTERNAL MEDICINE | Facility: CLINIC | Age: 84
End: 2024-01-10
Payer: MEDICARE

## 2024-01-10 NOTE — TELEPHONE ENCOUNTER
Caller: Adrian Balderrama    Relationship: Self    Best call back number: [phone]     What was the call regarding: PATIENT IS CONFUSED ABOUT HIS RECENT REFILL OF LISINOPRIL. HE STATES THAT DR. MORLEY HAD INCREASED HIM TO 40 MG BID AND THIS LAST REFILL SHOWS     lisinopril (PRINIVIL,ZESTRIL) 20 MG tablet   DAILY.    PLEASE CALL AND ADVISE.

## 2024-01-24 DIAGNOSIS — I10 ESSENTIAL HYPERTENSION: ICD-10-CM

## 2024-01-24 RX ORDER — FLUOXETINE HYDROCHLORIDE 40 MG/1
40 CAPSULE ORAL DAILY
Qty: 30 CAPSULE | Refills: 0 | Status: SHIPPED | OUTPATIENT
Start: 2024-01-24

## 2024-01-24 RX ORDER — HYDROCHLOROTHIAZIDE 12.5 MG/1
12.5 TABLET ORAL DAILY PRN
Qty: 30 TABLET | Refills: 0 | Status: SHIPPED | OUTPATIENT
Start: 2024-01-24

## 2024-01-24 RX ORDER — LISINOPRIL 40 MG/1
40 TABLET ORAL 2 TIMES DAILY
Qty: 60 TABLET | Refills: 0 | Status: SHIPPED | OUTPATIENT
Start: 2024-01-24

## 2024-02-01 DIAGNOSIS — I10 ESSENTIAL HYPERTENSION: ICD-10-CM

## 2024-02-01 RX ORDER — AMLODIPINE BESYLATE 5 MG/1
5 TABLET ORAL DAILY
Qty: 30 TABLET | Refills: 6 | Status: SHIPPED | OUTPATIENT
Start: 2024-02-01

## 2024-02-13 DIAGNOSIS — F40.243 FEAR OF FLYING: ICD-10-CM

## 2024-02-13 DIAGNOSIS — I10 ESSENTIAL HYPERTENSION: ICD-10-CM

## 2024-02-13 RX ORDER — LISINOPRIL 40 MG/1
40 TABLET ORAL 2 TIMES DAILY
Qty: 180 TABLET | Refills: 0 | Status: SHIPPED | OUTPATIENT
Start: 2024-02-13

## 2024-02-13 RX ORDER — ALPRAZOLAM 0.5 MG/1
TABLET ORAL
Qty: 10 TABLET | Refills: 0 | Status: SHIPPED | OUTPATIENT
Start: 2024-02-13

## 2024-02-13 RX ORDER — FLUOXETINE HYDROCHLORIDE 40 MG/1
40 CAPSULE ORAL DAILY
Qty: 30 CAPSULE | Refills: 0 | Status: SHIPPED | OUTPATIENT
Start: 2024-02-13

## 2024-02-13 RX ORDER — HYDROCHLOROTHIAZIDE 12.5 MG/1
12.5 TABLET ORAL DAILY PRN
Qty: 30 TABLET | Refills: 0 | Status: SHIPPED | OUTPATIENT
Start: 2024-02-13

## 2024-04-04 RX ORDER — PRAMIPEXOLE DIHYDROCHLORIDE 0.25 MG/1
TABLET ORAL
Qty: 120 TABLET | Refills: 1 | Status: SHIPPED | OUTPATIENT
Start: 2024-04-04

## 2024-05-03 DIAGNOSIS — M19.90 ARTHRITIS: Chronic | ICD-10-CM

## 2024-06-10 RX ORDER — PRAMIPEXOLE DIHYDROCHLORIDE 0.25 MG/1
TABLET ORAL
Qty: 120 TABLET | Refills: 0 | Status: SHIPPED | OUTPATIENT
Start: 2024-06-10

## 2024-07-02 DIAGNOSIS — I10 ESSENTIAL HYPERTENSION: Primary | ICD-10-CM

## 2024-07-09 DIAGNOSIS — I10 ESSENTIAL HYPERTENSION: Chronic | ICD-10-CM

## 2024-07-09 RX ORDER — LISINOPRIL 20 MG/1
20 TABLET ORAL DAILY
Qty: 90 TABLET | Refills: 1 | Status: SHIPPED | OUTPATIENT
Start: 2024-07-09

## 2024-07-09 RX ORDER — PRAMIPEXOLE DIHYDROCHLORIDE 0.25 MG/1
TABLET ORAL
Qty: 120 TABLET | Refills: 0 | Status: SHIPPED | OUTPATIENT
Start: 2024-07-09

## 2024-07-15 ENCOUNTER — OFFICE VISIT (OUTPATIENT)
Dept: INTERNAL MEDICINE | Facility: CLINIC | Age: 84
End: 2024-07-15
Payer: MEDICARE

## 2024-07-15 VITALS
SYSTOLIC BLOOD PRESSURE: 132 MMHG | HEIGHT: 73 IN | WEIGHT: 215 LBS | DIASTOLIC BLOOD PRESSURE: 68 MMHG | BODY MASS INDEX: 28.49 KG/M2 | HEART RATE: 74 BPM

## 2024-07-15 DIAGNOSIS — N32.81 OVERACTIVE BLADDER: Chronic | ICD-10-CM

## 2024-07-15 DIAGNOSIS — M47.816 LUMBAR SPONDYLOSIS: Chronic | ICD-10-CM

## 2024-07-15 DIAGNOSIS — I10 ESSENTIAL HYPERTENSION: Primary | Chronic | ICD-10-CM

## 2024-07-15 PROCEDURE — 1160F RVW MEDS BY RX/DR IN RCRD: CPT | Performed by: INTERNAL MEDICINE

## 2024-07-15 PROCEDURE — 3075F SYST BP GE 130 - 139MM HG: CPT | Performed by: INTERNAL MEDICINE

## 2024-07-15 PROCEDURE — 99214 OFFICE O/P EST MOD 30 MIN: CPT | Performed by: INTERNAL MEDICINE

## 2024-07-15 PROCEDURE — 3078F DIAST BP <80 MM HG: CPT | Performed by: INTERNAL MEDICINE

## 2024-07-15 PROCEDURE — 1159F MED LIST DOCD IN RCRD: CPT | Performed by: INTERNAL MEDICINE

## 2024-07-15 PROCEDURE — G2211 COMPLEX E/M VISIT ADD ON: HCPCS | Performed by: INTERNAL MEDICINE

## 2024-07-15 NOTE — PROGRESS NOTES
"Chief Complaint  Hypertension    Subjective        Adrian Balderrama presents to [facility]  History of Present Illness Feels he is about the same- gets inj  into back to help with some pain- he is thinking about a nerve stimulator. He doesn't think that what they have given him has helped much.  He is able to walk with a basket at the grocery store but otherwise has pain with walking. He is otherwise staying busy- ride a scooter in his yard, etc.   He is not taking the tamsulosin- does well with oxybutynin  BP readings at home are @ 130/70. No headaches, side effects.     Objective   Vital Signs:  /68   Pulse 74   Ht 185.4 cm (73\")   Wt 97.5 kg (215 lb)   BMI 28.37 kg/m²   Estimated body mass index is 28.37 kg/m² as calculated from the following:    Height as of this encounter: 185.4 cm (73\").    Weight as of this encounter: 97.5 kg (215 lb).               Physical Exam  Constitutional:       Appearance: Normal appearance.   Cardiovascular:      Rate and Rhythm: Normal rate and regular rhythm.   Musculoskeletal:      Right lower leg: No edema.      Left lower leg: No edema.        Result Review :        Data reviewed : Consultant notes pain management.              Assessment and Plan     Diagnoses and all orders for this visit:    1. Essential hypertension (Primary)  Comments:  Controlled, no change.    2. Overactive bladder  Comments:  doing well with the oxybutynin- has  f/u.    3. Lumbar spondylosis  Comments:  limits activity- encouraged him to walk the stores with basket- short term handicap parking given to him- encouraged activity as tolerated.             Follow Up     Return in about 6 months (around 1/15/2025) for Medicare Wellness, Lab Today.  Patient was given instructions and counseling regarding his condition or for health maintenance advice. Please see specific information pulled into the AVS if appropriate.         "

## 2024-07-31 DIAGNOSIS — M19.90 ARTHRITIS: Chronic | ICD-10-CM

## 2024-07-31 RX ORDER — CELECOXIB 100 MG/1
CAPSULE ORAL
Qty: 180 CAPSULE | Refills: 0 | Status: SHIPPED | OUTPATIENT
Start: 2024-07-31

## 2024-08-12 RX ORDER — PRAMIPEXOLE DIHYDROCHLORIDE 0.25 MG/1
TABLET ORAL
Qty: 120 TABLET | Refills: 0 | Status: SHIPPED | OUTPATIENT
Start: 2024-08-12

## 2024-08-21 DIAGNOSIS — K21.00 GASTROESOPHAGEAL REFLUX DISEASE WITH ESOPHAGITIS WITHOUT HEMORRHAGE: ICD-10-CM

## 2024-08-21 RX ORDER — PANTOPRAZOLE SODIUM 40 MG/1
40 TABLET, DELAYED RELEASE ORAL DAILY
Qty: 30 TABLET | OUTPATIENT
Start: 2024-08-21

## 2024-08-27 ENCOUNTER — TELEPHONE (OUTPATIENT)
Dept: INTERNAL MEDICINE | Facility: CLINIC | Age: 84
End: 2024-08-27
Payer: MEDICARE

## 2024-08-27 RX ORDER — FLUOXETINE 40 MG/1
40 CAPSULE ORAL DAILY
Qty: 90 CAPSULE | Refills: 1 | Status: SHIPPED | OUTPATIENT
Start: 2024-08-27

## 2024-08-27 RX ORDER — HYDROCHLOROTHIAZIDE 12.5 MG/1
12.5 TABLET ORAL DAILY
Qty: 90 TABLET | Refills: 1 | Status: SHIPPED | OUTPATIENT
Start: 2024-08-27

## 2024-08-27 NOTE — TELEPHONE ENCOUNTER
Caller: Adrian Balderrama    Relationship: Self    Best call back [phone] (Mobile)     Requested Prescriptions:     FLUoxetine (PROzac) 40 MG capsule       hydroCHLOROthiazide 12.5 MG tablet        Pharmacy where request should be sent:  [facility] [id number] - [place], [place] - [address] - [phone] PH - [phone] FX     Last office visit with prescribing clinician: 7/15/2024   Last telemedicine visit with prescribing clinician: Visit date not found   Next office visit with prescribing clinician: Visit date not found     Additional details provided by patient: PATIENT CALLED TO REQUEST A MEDICATION REFILL ON MEDICATION WITH A 90 DAY SUPPLY. PATIENT HAS A 8 DAY SUPPLY LEFT.      Does the patient have less than a 3 day supply:  [] Yes  [x] No    Would you like a call back once the refill request has been completed: [] Yes [] No    If the office needs to give you a call back, can they leave a voicemail: [] Yes [] No    Ángel Paz Rep   08/27/24 13:49 EDT         THANKS

## 2024-09-02 DIAGNOSIS — I10 ESSENTIAL HYPERTENSION: ICD-10-CM

## 2024-09-03 ENCOUNTER — TELEPHONE (OUTPATIENT)
Dept: INTERNAL MEDICINE | Facility: CLINIC | Age: 84
End: 2024-09-03
Payer: MEDICARE

## 2024-09-03 DIAGNOSIS — I10 ESSENTIAL HYPERTENSION: ICD-10-CM

## 2024-09-03 RX ORDER — HYDROCHLOROTHIAZIDE 12.5 MG/1
12.5 TABLET ORAL DAILY PRN
Qty: 30 TABLET | Refills: 4 | Status: SHIPPED | OUTPATIENT
Start: 2024-09-03 | End: 2024-09-03 | Stop reason: SDUPTHER

## 2024-09-03 RX ORDER — AMLODIPINE BESYLATE 5 MG/1
5 TABLET ORAL DAILY
Qty: 90 TABLET | Refills: 1 | Status: SHIPPED | OUTPATIENT
Start: 2024-09-03

## 2024-09-03 RX ORDER — FLUOXETINE 40 MG/1
40 CAPSULE ORAL DAILY
Qty: 30 CAPSULE | Refills: 4 | Status: SHIPPED | OUTPATIENT
Start: 2024-09-03

## 2024-09-03 RX ORDER — HYDROCHLOROTHIAZIDE 12.5 MG/1
12.5 TABLET ORAL DAILY
Qty: 90 TABLET | Refills: 1 | Status: SHIPPED | OUTPATIENT
Start: 2024-09-03

## 2024-09-03 RX ORDER — AMLODIPINE BESYLATE 5 MG/1
5 TABLET ORAL DAILY
Qty: 30 TABLET | Refills: 4 | Status: SHIPPED | OUTPATIENT
Start: 2024-09-03 | End: 2024-09-03 | Stop reason: SDUPTHER

## 2024-09-03 NOTE — TELEPHONE ENCOUNTER
Caller: Adrian Balderrama    Relationship: Self    Best call back number: [phone]    Which medication are you concerned about: amLODIPine (NORVASC) 5 MG tablet , hydroCHLOROthiazide 12.5 MG tablet ,  FLUoxetine (PROzac) 40 MG capsule        Who prescribed you this medication: DR MORLEY    What are your concerns: MEDICATION WAS SENT AS 30 DAYS. PATIENT REQUESTING CHANGED TO 90 DAY SUPPLY SO HE DOESN'T HAVE TO GO TO THE PHARMACY SO MANY TIMES.    ADRIAN WOULD LIKE A CALL WHEN CHANGED OR WITH ANY ISSUES

## 2024-09-09 RX ORDER — PRAMIPEXOLE DIHYDROCHLORIDE 0.25 MG/1
TABLET ORAL
Qty: 120 TABLET | Refills: 0 | Status: SHIPPED | OUTPATIENT
Start: 2024-09-09

## 2024-09-11 RX ORDER — LOPERAMIDE HCL 2 MG
CAPSULE ORAL
Qty: 90 CAPSULE | OUTPATIENT
Start: 2024-09-11

## 2024-09-13 RX ORDER — LOPERAMIDE HYDROCHLORIDE 2 MG/1
2 TABLET ORAL 4 TIMES DAILY PRN
Qty: 90 TABLET | Refills: 3 | Status: SHIPPED | OUTPATIENT
Start: 2024-09-13

## 2024-09-13 NOTE — TELEPHONE ENCOUNTER
Caller: ConchisAdrian vargas    Relationship: Self    Requested Prescriptions:   Requested Prescriptions     Pending Prescriptions Disp Refills    loperamide (Imodium A-D) 2 MG tablet 90 tablet 3     Sig: Take 1 tablet by mouth 4 (Four) Times a Day As Needed for Diarrhea.      Pharmacy where request should be sent:  [facility] [address]     Last office visit with prescribing clinician: 7/15/2024   Last telemedicine visit with prescribing clinician: Visit date not found   Next office visit with prescribing clinician: Visit date not found     Additional details provided by patient: PATIENT STATES HE DOES NOT HAVE DIARRHEA BUT THAT THIS MEDICATION KEEPS HIM REGULAR BECAUSE HE HAD A COLON SURGERY.         Does the patient have less than a 3 day supply:  [x] Yes  [] No    Would you like a call back once the refill request has been completed: [] Yes [x] No    If the office needs to give you a call back, can they leave a voicemail: [] Yes [x] No    Ángel Maldonado Rep   09/13/24 10:13 EDT

## 2024-09-30 ENCOUNTER — LAB (OUTPATIENT)
Facility: HOSPITAL | Age: 84
End: 2024-09-30
Payer: MEDICARE

## 2024-09-30 DIAGNOSIS — I10 ESSENTIAL HYPERTENSION: Primary | ICD-10-CM

## 2024-09-30 LAB
ALBUMIN SERPL-MCNC: 3.8 G/DL (ref 3.5–5.2)
ALBUMIN/GLOB SERPL: 1.7 G/DL
ALP SERPL-CCNC: 82 U/L (ref 39–117)
ALT SERPL W P-5'-P-CCNC: 8 U/L (ref 1–41)
ANION GAP SERPL CALCULATED.3IONS-SCNC: 9 MMOL/L (ref 5–15)
AST SERPL-CCNC: 10 U/L (ref 1–40)
BILIRUB SERPL-MCNC: 0.4 MG/DL (ref 0–1.2)
BUN SERPL-MCNC: 24 MG/DL (ref 8–23)
BUN/CREAT SERPL: 17.8 (ref 7–25)
CALCIUM SPEC-SCNC: 8.7 MG/DL (ref 8.6–10.5)
CHLORIDE SERPL-SCNC: 103 MMOL/L (ref 98–107)
CHOLEST SERPL-MCNC: 145 MG/DL (ref 0–200)
CO2 SERPL-SCNC: 26 MMOL/L (ref 22–29)
CREAT SERPL-MCNC: 1.35 MG/DL (ref 0.76–1.27)
EGFRCR SERPLBLD CKD-EPI 2021: 52.4 ML/MIN/1.73
GLOBULIN UR ELPH-MCNC: 2.2 GM/DL
GLUCOSE SERPL-MCNC: 99 MG/DL (ref 65–99)
HDLC SERPL-MCNC: 45 MG/DL (ref 40–60)
HOLD SPECIMEN: NORMAL
LDLC SERPL CALC-MCNC: 88 MG/DL (ref 0–100)
LDLC/HDLC SERPL: 1.98 {RATIO}
POTASSIUM SERPL-SCNC: 4.3 MMOL/L (ref 3.5–5.2)
PROT SERPL-MCNC: 6 G/DL (ref 6–8.5)
SODIUM SERPL-SCNC: 138 MMOL/L (ref 136–145)
TRIGL SERPL-MCNC: 55 MG/DL (ref 0–150)
VLDLC SERPL-MCNC: 12 MG/DL (ref 5–40)

## 2024-09-30 PROCEDURE — 80061 LIPID PANEL: CPT | Performed by: INTERNAL MEDICINE

## 2024-09-30 PROCEDURE — 80053 COMPREHEN METABOLIC PANEL: CPT | Performed by: INTERNAL MEDICINE

## 2024-10-08 RX ORDER — PRAMIPEXOLE DIHYDROCHLORIDE 0.25 MG/1
TABLET ORAL
Qty: 120 TABLET | Refills: 0 | Status: SHIPPED | OUTPATIENT
Start: 2024-10-08

## 2024-10-09 ENCOUNTER — TELEPHONE (OUTPATIENT)
Dept: INTERNAL MEDICINE | Facility: CLINIC | Age: 84
End: 2024-10-09
Payer: MEDICARE

## 2024-10-09 NOTE — TELEPHONE ENCOUNTER
Caller: Adrina Balderrama    Relationship: Self    Best call back number: [phone]     Caller requesting test results: PATIENT    What test was performed: BLOOD WORK    When was the test performed: 9/30/24    Where was the test performed: OFFICE    Additional notes: PATIENT CALLED TO GET AN UPDATE ON HIS LAB RESULTS    PLEASE CALL BACK TO ADVISE

## 2024-10-09 NOTE — TELEPHONE ENCOUNTER
All completely normal- should have [facility] on the books as f/u in Jan. Was requested at his last appt.

## 2024-11-01 DIAGNOSIS — I10 ESSENTIAL HYPERTENSION: ICD-10-CM

## 2024-11-01 DIAGNOSIS — M19.90 ARTHRITIS: Chronic | ICD-10-CM

## 2024-11-01 RX ORDER — LISINOPRIL 40 MG/1
40 TABLET ORAL 2 TIMES DAILY
Qty: 180 TABLET | Refills: 0 | Status: SHIPPED | OUTPATIENT
Start: 2024-11-01

## 2024-11-01 RX ORDER — CELECOXIB 100 MG/1
CAPSULE ORAL
Qty: 180 CAPSULE | Refills: 0 | Status: SHIPPED | OUTPATIENT
Start: 2024-11-01

## 2024-11-12 RX ORDER — PRAMIPEXOLE DIHYDROCHLORIDE 0.25 MG/1
TABLET ORAL
Qty: 120 TABLET | Refills: 0 | Status: SHIPPED | OUTPATIENT
Start: 2024-11-12

## 2024-12-11 RX ORDER — PRAMIPEXOLE DIHYDROCHLORIDE 0.25 MG/1
TABLET ORAL
Qty: 120 TABLET | Refills: 0 | Status: SHIPPED | OUTPATIENT
Start: 2024-12-11

## 2025-01-13 RX ORDER — PRAMIPEXOLE DIHYDROCHLORIDE 0.25 MG/1
TABLET ORAL
Qty: 120 TABLET | Refills: 0 | Status: SHIPPED | OUTPATIENT
Start: 2025-01-13

## 2025-01-23 ENCOUNTER — TELEPHONE (OUTPATIENT)
Dept: GASTROENTEROLOGY | Facility: CLINIC | Age: 85
End: 2025-01-23
Payer: MEDICARE

## 2025-01-23 NOTE — TELEPHONE ENCOUNTER
Patient left message for refill request of pantoprazole 40mg.  Patient last seen 8/17/23 for EGD.  Called patient. No answer. Left voicemail refill denied until appointment has been made as [facility] requires a patient be seen yearly.

## 2025-01-29 DIAGNOSIS — I10 ESSENTIAL HYPERTENSION: ICD-10-CM

## 2025-01-29 DIAGNOSIS — M19.90 ARTHRITIS: Chronic | ICD-10-CM

## 2025-01-29 RX ORDER — CELECOXIB 100 MG/1
CAPSULE ORAL
Qty: 180 CAPSULE | Refills: 0 | OUTPATIENT
Start: 2025-01-29

## 2025-01-29 RX ORDER — LISINOPRIL 40 MG/1
40 TABLET ORAL 2 TIMES DAILY
Qty: 180 TABLET | Refills: 0 | OUTPATIENT
Start: 2025-01-29

## 2025-02-01 DIAGNOSIS — M19.90 ARTHRITIS: Chronic | ICD-10-CM

## 2025-02-03 RX ORDER — CELECOXIB 100 MG/1
CAPSULE ORAL
Qty: 180 CAPSULE | Refills: 0 | OUTPATIENT
Start: 2025-02-03

## 2025-02-03 RX ORDER — FLUOXETINE 40 MG/1
40 CAPSULE ORAL DAILY
Qty: 60 CAPSULE | OUTPATIENT
Start: 2025-02-03

## 2025-02-04 RX ORDER — FLUOXETINE 40 MG/1
40 CAPSULE ORAL DAILY
Qty: 60 CAPSULE | Refills: 0 | Status: SHIPPED | OUTPATIENT
Start: 2025-02-04

## 2025-02-11 RX ORDER — PRAMIPEXOLE DIHYDROCHLORIDE 0.25 MG/1
TABLET ORAL
Qty: 120 TABLET | Refills: 0 | OUTPATIENT
Start: 2025-02-11

## 2025-02-19 ENCOUNTER — TELEPHONE (OUTPATIENT)
Dept: GASTROENTEROLOGY | Facility: CLINIC | Age: 85
End: 2025-02-19

## 2025-02-19 DIAGNOSIS — K21.00 GASTROESOPHAGEAL REFLUX DISEASE WITH ESOPHAGITIS WITHOUT HEMORRHAGE: ICD-10-CM

## 2025-02-19 RX ORDER — PANTOPRAZOLE SODIUM 40 MG/1
40 TABLET, DELAYED RELEASE ORAL DAILY
Qty: 90 TABLET | Refills: 3 | Status: SHIPPED | OUTPATIENT
Start: 2025-02-19

## 2025-02-19 NOTE — TELEPHONE ENCOUNTER
Caller: Adrian Balderrama    Relationship: Self    Best call back number: [phone]    Which medication are you concerned about: pantoprazole (PROTONIX) 40 MG EC tablet     Who prescribed you this medication: ISREAL THOMSON    When did you start taking this medication: 07.09.24    What are your concerns: PT STATES THE MEDICATION WORKS WELL BUT [facility] ADVISED THERE ARE NO REFILLS ON HIS PRESCRIPTION. PT WOULD LIKE A CALL BACK TO ADVISE WHY THERE ARE NO REFILLS. PLEASE CONTACT PT TO ASSIST.

## 2025-02-26 RX ORDER — LOPERAMIDE HYDROCHLORIDE 2 MG/1
2 TABLET ORAL 4 TIMES DAILY PRN
Qty: 90 TABLET | Refills: 0 | Status: SHIPPED | OUTPATIENT
Start: 2025-02-26

## 2025-02-26 NOTE — TELEPHONE ENCOUNTER
Caller: Adrian Balderrama    Relationship: Self    Requested Prescriptions:   Requested Prescriptions     Pending Prescriptions Disp Refills    loperamide (Imodium A-D) 2 MG tablet 90 tablet 3     Sig: Take 1 tablet by mouth 4 (Four) Times a Day As Needed for Diarrhea.      Pharm[facility] where [facility] be sent: [id number] [address] [facility] AT [facility] [phone] [facility] [phone]      Last office visit with prescribing clinician: 7/15/2024   Last telemedicine visit with prescribing clinician: Visit date not found   Next office visit with prescribing clinician: Visit date not found     Additional details provided by patient: PATIENT WANTS A 90 DAY SUPPLY.     Ángel Maldonado Rep   02/26/25 14:01 EST

## 2025-03-04 RX ORDER — LOPERAMIDE HYDROCHLORIDE 2 MG/1
2 TABLET ORAL 4 TIMES DAILY PRN
Qty: 90 TABLET | Refills: 0 | Status: SHIPPED | OUTPATIENT
Start: 2025-03-04

## 2025-03-04 NOTE — TELEPHONE ENCOUNTER
Caller: Adrian Balderrama    Relationship: Self    Best call back number: [phone]    Requested Prescriptions:   Requested Prescriptions     Pending Prescriptions Disp Refills    loperamide (Imodium A-D) 2 MG tablet 90 tablet 0     Sig: Take 1 tablet by mouth 4 (Four) Times a Day As Needed for Diarrhea.        Pharmacy where request should be sent: [id number] [facility] [address] [facility] AT [facility] [phone] [facility] [phone]      Last office visit with prescribing clinician: 7/15/2024   Last telemedicine visit with prescribing clinician: Visit date not found   Next office visit with prescribing clinician: Visit date not found     Additional details provided by patient: STATED THAT THEY WERE TOLD BY THE PHARMACY THAT THEY HAVE NOT RECEIVED A PRESCRIPTION FOR THE MEDICATION SO THEY WOULD LIKE TO SEE IF IT CAN BE RESENT TO THEM. STATED THAT THAT THE ARE OUT OF THE MEDICATION    Does the patient have less than a 3 day supply:  [x] Yes  [] No    Would you like a call back once the refill request has been completed: [] Yes [x] No    If the office needs to give you a call back, can they leave a voicemail: [] Yes [x] No    Ángel Cortes Rep   03/04/25 12:47 EST

## 2025-03-05 DIAGNOSIS — I10 ESSENTIAL HYPERTENSION: ICD-10-CM

## 2025-03-05 RX ORDER — LISINOPRIL 40 MG/1
40 TABLET ORAL 2 TIMES DAILY
Qty: 180 TABLET | Refills: 0 | Status: SHIPPED | OUTPATIENT
Start: 2025-03-05

## 2025-03-05 NOTE — TELEPHONE ENCOUNTER
Caller: Adrian Balderrama    Relationship to patient: Self    Best call back number: [phone]      Patient is needing:     PLEASE CALL PATIENT WHEN SENT IN.     PLEASE ADVISE.

## 2025-03-05 NOTE — TELEPHONE ENCOUNTER
Caller: Adrian Balderrama    Relationship: Self    Best call back number: [phone]      Requested Prescriptions:   Requested Prescriptions     Pending Prescriptions Disp Refills    lisinopril (PRINIVIL,ZESTRIL) 40 MG tablet 180 tablet 0     Sig: Take 1 tablet by mouth 2 (Two) Times a Day.        Pharmacy where request should be sent: [facility] #[id number] [place] [id number] [facility] AT [facility] [phone] [facility] [phone]      Last office visit with prescribing clinician: 7/15/2024   Last telemedicine visit with prescribing clinician: Visit date not found   Next office visit with prescribing clinician: Visit date not found     Additional details provided by patient: OUT OF MEDICATION.   Does the patient have less than a 3 day supply:  [x] Yes  [] No    Would you like a call back once the refill request has been completed: [x] Yes [] No    If the office needs to give you a call back, can they leave a voicemail: [x] Yes [] No    Ángel Allen Rep   03/05/25 16:11 EST

## 2025-03-06 DIAGNOSIS — I10 ESSENTIAL HYPERTENSION: Chronic | ICD-10-CM

## 2025-03-06 RX ORDER — LISINOPRIL 20 MG/1
20 TABLET ORAL DAILY
Qty: 90 TABLET | Refills: 0 | Status: SHIPPED | OUTPATIENT
Start: 2025-03-06

## 2025-04-04 DIAGNOSIS — I10 ESSENTIAL HYPERTENSION: ICD-10-CM

## 2025-04-05 RX ORDER — AMLODIPINE BESYLATE 5 MG/1
5 TABLET ORAL DAILY
Qty: 90 TABLET | Refills: 1 | OUTPATIENT
Start: 2025-04-05

## 2025-04-10 RX ORDER — LOPERAMIDE HYDROCHLORIDE 2 MG/1
2 TABLET ORAL 4 TIMES DAILY PRN
Qty: 90 TABLET | Refills: 0 | Status: SHIPPED | OUTPATIENT
Start: 2025-04-10

## 2025-04-10 NOTE — TELEPHONE ENCOUNTER
Caller: Adrian Balderrama    Relationship: Self    Best call back number: [phone]     Requested Prescriptions:   Requested Prescriptions     Pending Prescriptions Disp Refills    loperamide (Imodium A-D) 2 MG tablet 90 tablet 0     Sig: Take 1 tablet by mouth 4 (Four) Times a Day As Needed for Diarrhea.        Pharmacy where request should be sent: [facility] #[id number] [facility] [address] [facility] AT [facility] [phone] [facility] [phone]      Last office visit with prescribing clinician: 7/15/2024   Last telemedicine visit with prescribing clinician: Visit date not found   Next office visit with prescribing clinician: Visit date not found     Additional details provided by patient: PATIENT STATES HE IS OUT OF THIS MEDICATION    Does the patient have less than a 3 day supply:  [x] Yes  [] No    Ángel Mccormick Rep   04/10/25 15:06 EDT

## 2025-04-14 RX ORDER — FLUOXETINE HYDROCHLORIDE 40 MG/1
40 CAPSULE ORAL DAILY
Qty: 60 CAPSULE | Refills: 0 | Status: SHIPPED | OUTPATIENT
Start: 2025-04-14

## 2025-04-22 DIAGNOSIS — I10 ESSENTIAL HYPERTENSION: Chronic | ICD-10-CM

## 2025-04-22 RX ORDER — PRAMIPEXOLE DIHYDROCHLORIDE 0.25 MG/1
TABLET ORAL
Qty: 120 TABLET | Refills: 0 | OUTPATIENT
Start: 2025-04-22

## 2025-04-22 RX ORDER — LISINOPRIL 20 MG/1
20 TABLET ORAL DAILY
Qty: 90 TABLET | Refills: 0 | OUTPATIENT
Start: 2025-04-22

## 2025-04-22 RX ORDER — FLUOXETINE HYDROCHLORIDE 40 MG/1
40 CAPSULE ORAL DAILY
Qty: 60 CAPSULE | Refills: 0 | OUTPATIENT
Start: 2025-04-22

## 2025-04-22 NOTE — TELEPHONE ENCOUNTER
PATIENT CALLED FOR MEDICATION REFILL OF    FLUoxetine (PROzac) 40 MG capsule   HE IS OUT OF MEDICATION      lisinopril (PRINIVIL,ZESTRIL) 20 MG tablet      HE IS OUT OF MEDICATION      pramipexole (MIRAPEX) 0.25 MG tablet   HE IS OUT OF MEDICATION    [facility] #[id number] - [facility] [address] AT [facility] [address] [facility] [address] - [phone] [facility] [phone]  [phone]     CALL BACK NUMBER [phone]

## 2025-04-28 ENCOUNTER — TELEPHONE (OUTPATIENT)
Dept: INTERNAL MEDICINE | Facility: CLINIC | Age: 85
End: 2025-04-28
Payer: MEDICARE

## 2025-04-28 DIAGNOSIS — I10 ESSENTIAL HYPERTENSION: Chronic | ICD-10-CM

## 2025-04-28 DIAGNOSIS — I10 ESSENTIAL HYPERTENSION: ICD-10-CM

## 2025-04-28 RX ORDER — FLUOXETINE HYDROCHLORIDE 40 MG/1
40 CAPSULE ORAL DAILY
Qty: 60 CAPSULE | Refills: 0 | Status: SHIPPED | OUTPATIENT
Start: 2025-04-28

## 2025-04-28 RX ORDER — LISINOPRIL 20 MG/1
20 TABLET ORAL DAILY
Qty: 90 TABLET | Refills: 0 | Status: SHIPPED | OUTPATIENT
Start: 2025-04-28

## 2025-04-28 RX ORDER — PRAMIPEXOLE DIHYDROCHLORIDE 0.25 MG/1
TABLET ORAL
Qty: 120 TABLET | Refills: 0 | Status: SHIPPED | OUTPATIENT
Start: 2025-04-28 | End: 2025-05-01 | Stop reason: SDUPTHER

## 2025-04-28 NOTE — TELEPHONE ENCOUNTER
Patient has not been seen since July. Do you want him to wait for refill until has appointment on Wed, patient requesting refill today.

## 2025-04-30 ENCOUNTER — OFFICE VISIT (OUTPATIENT)
Dept: INTERNAL MEDICINE | Facility: CLINIC | Age: 85
End: 2025-04-30
Payer: MEDICARE

## 2025-04-30 VITALS
WEIGHT: 215 LBS | BODY MASS INDEX: 28.49 KG/M2 | HEART RATE: 74 BPM | SYSTOLIC BLOOD PRESSURE: 128 MMHG | HEIGHT: 73 IN | DIASTOLIC BLOOD PRESSURE: 70 MMHG

## 2025-04-30 DIAGNOSIS — I10 ESSENTIAL HYPERTENSION: Chronic | ICD-10-CM

## 2025-04-30 DIAGNOSIS — M47.816 LUMBAR SPONDYLOSIS: Chronic | ICD-10-CM

## 2025-04-30 DIAGNOSIS — Z00.00 MEDICARE ANNUAL WELLNESS VISIT, SUBSEQUENT: Primary | ICD-10-CM

## 2025-04-30 DIAGNOSIS — M19.90 ARTHRITIS: Chronic | ICD-10-CM

## 2025-04-30 DIAGNOSIS — G47.33 OSA ON CPAP: Chronic | ICD-10-CM

## 2025-04-30 RX ORDER — AMLODIPINE BESYLATE 5 MG/1
5 TABLET ORAL DAILY
Qty: 90 TABLET | Refills: 1 | Status: SHIPPED | OUTPATIENT
Start: 2025-04-30

## 2025-04-30 RX ORDER — CELECOXIB 100 MG/1
100 CAPSULE ORAL 2 TIMES DAILY PRN
Qty: 180 CAPSULE | Refills: 0 | Status: SHIPPED | OUTPATIENT
Start: 2025-04-30

## 2025-04-30 RX ORDER — HYDROCHLOROTHIAZIDE 12.5 MG/1
12.5 TABLET ORAL DAILY
Qty: 90 TABLET | Refills: 1 | Status: SHIPPED | OUTPATIENT
Start: 2025-04-30

## 2025-04-30 NOTE — PROGRESS NOTES
Subjective   The ABCs of the Annual Wellness Visit  Medicare Wellness Visit      Adrian Balderrama is a 85 y.o. patient who presents for a Medicare Wellness Visit.    The following portions of the patient's history were reviewed and   updated as appropriate: allergies, current medications, past family history, past medical history, past social history, past surgical history, and problem list.    Compared to one year ago, the patient's physical   health is the same.  Compared to one year ago, the patient's mental   health is the same.    Recent Hospitalizations:  He was not admitted to the hospital during the last year.     Current Medical Providers:  Patient Care Team:  Dior Garsia MD as PCP - General (Internal Medicine)  Vj Sanders MD as Consulting Physician (Urology)  Zion Shelton APRN (Pain Medicine)    Outpatient Medications Prior to Visit   Medication Sig Dispense Refill    FLUoxetine (PROzac) 40 MG capsule Take 1 capsule by mouth Daily. 60 capsule 0    HYDROcodone-acetaminophen (NORCO) 7.5-325 MG per tablet Every 12 (Twelve) Hours.      lisinopril (PRINIVIL,ZESTRIL) 20 MG tablet Take 1 tablet by mouth Daily. 90 tablet 0    loperamide (Imodium A-D) 2 MG tablet Take 1 tablet by mouth 4 (Four) Times a Day As Needed for Diarrhea. 90 tablet 0    oxybutynin XL (DITROPAN-XL) 5 MG 24 hr tablet       pantoprazole (PROTONIX) 40 MG EC tablet Take 1 tablet by mouth Daily. 90 tablet 3    pramipexole (MIRAPEX) 0.25 MG tablet TAKE 3 TO 4 TABLETS BY MOUTH DAILY AS NEEDED 120 tablet 0    amLODIPine (NORVASC) 5 MG tablet Take 1 tablet by mouth Daily. 90 tablet 1    celecoxib (CeleBREX) 100 MG capsule TAKE 1 CAPSULE BY MOUTH 2 TIMES A DAY AS NEEDED FOR MILD PAIN 180 capsule 0    hydroCHLOROthiazide 12.5 MG tablet Take 1 tablet by mouth Daily. 90 tablet 1     No facility-administered medications prior to visit.     Opioid medication/s are on active medication list.  and I have evaluated his active  "treatment plan and pain score trends (see table).  There were no vitals filed for this visit.  I have reviewed the chart for potential of high risk medication and harmful drug interactions in the elderly.        Aspirin is not on active medication list.  Aspirin use is not indicated based on review of current medical condition/s. Risk of harm outweighs potential benefits.  .    Patient Active Problem List   Diagnosis    Depression    RLS (restless legs syndrome)    Arthritis    Essential hypertension    LUANN on CPAP    Primary osteoarthritis of right knee    S/P hip replacement, left    Overactive bladder    Malignant neoplasm of descending colon    erectile dysfunction    Esophageal dysphagia    GERD (gastroesophageal reflux disease)    Lumbar spondylosis     Advance Care Planning Advance Directive is not on file.  ACP discussion was held with the patient during this visit. Patient has an advance directive (not in EMR), copy requested.            Objective   Vitals:    04/30/25 1120   BP: 128/70   Pulse: 74   Weight: 97.5 kg (215 lb)   Height: 185.4 cm (73\")       Estimated body mass index is 28.37 kg/m² as calculated from the following:    Height as of this encounter: 185.4 cm (73\").    Weight as of this encounter: 97.5 kg (215 lb).    BMI is >= 25 and <30. (Overweight) The following options were offered after discussion;: exercise counseling/recommendations and nutrition counseling/recommendations           Does the patient have evidence of cognitive impairment? No                                                                                                Health  Risk Assessment    Smoking Status:  Social History     Tobacco Use   Smoking Status Former   Smokeless Tobacco Never     Alcohol Consumption:  Social History     Substance and Sexual Activity   Alcohol Use No       Fall Risk Screen  STEADI Fall Risk Assessment was completed, and patient is at LOW risk for falls.Assessment completed " on:2025    Depression Screening   Little interest or pleasure in doing things? Not at all   Feeling down, depressed, or hopeless? Not at all   PHQ-2 Total Score 0      Health Habits and Functional and Cognitive Screenin/30/2025    11:21 AM   Functional & Cognitive Status   Do you have difficulty preparing food and eating? No   Do you have difficulty bathing yourself, getting dressed or grooming yourself? No   Do you have difficulty using the toilet? No   Do you have difficulty moving around from place to place? No   Do you have trouble with steps or getting out of a bed or a chair? No   Current Diet Well Balanced Diet   Dental Exam Up to date   Eye Exam Up to date   Exercise (times per week) 0 times per week   Current Exercises Include No Regular Exercise   Do you need help using the phone?  No   Are you deaf or do you have serious difficulty hearing?  No   Do you need help to go to places out of walking distance? No   Do you need help shopping? No   Do you need help preparing meals?  No   Do you need help with housework?  No   Do you need help with laundry? No   Do you need help taking your medications? No   Do you need help managing money? No   Do you ever drive or ride in a car without wearing a seat belt? No   Have you felt unusual stress, anger or loneliness in the last month? No   Who do you live with? Alone   If you need help, do you have trouble finding someone available to you? No   Have you been bothered in the last four weeks by sexual problems? No   Do you have difficulty concentrating, remembering or making decisions? No           Age-appropriate Screening Schedule:  Refer to the list below for future screening recommendations based on patient's age, sex and/or medical conditions. Orders for these recommended tests are listed in the plan section. The patient has been provided with a written plan.    Health Maintenance List  Health Maintenance   Topic Date Due    TDAP/TD VACCINES (1 - Tdap)  "Never done    RSV Vaccine - Adults (1 - 1-dose 75+ series) Never done    COVID-19 Vaccine (6 - 2024-25 season) 09/01/2024    ANNUAL WELLNESS VISIT  01/08/2025    INFLUENZA VACCINE  07/01/2025    Pneumococcal Vaccine 50+  Completed    ZOSTER VACCINE  Completed                                                                                                                                                CMS Preventative Services Quick Reference  Risk Factors Identified During Encounter  Immunizations Discussed/Encouraged: RSv    The above risks/problems have been discussed with the patient.  Pertinent information has been shared with the patient in the After Visit Summary.  An After Visit Summary and PPPS were made available to the patient.    Follow Up:   Next Medicare Wellness visit to be scheduled in 1 year.         Additional E&M Note during same encounter follows:  Patient has additional, significant, and separately identifiable condition(s)/problem(s) that require work above and beyond the Medicare Wellness Visit     Chief Complaint  Medicare Wellness-subsequent    Subjective   HPI  Adrian is also being seen today for additional medical problem/s.   Had a nerve stimulator implanted- still having issues and working with them to get it right -   Travels with car shows-               Objective   Vital Signs:  /70   Pulse 74   Ht 185.4 cm (73\")   Wt 97.5 kg (215 lb)   BMI 28.37 kg/m²   Physical Exam  Constitutional:       Appearance: Normal appearance.   Cardiovascular:      Rate and Rhythm: Normal rate and regular rhythm.   Musculoskeletal:      Right lower leg: No edema.      Left lower leg: No edema.            Assessment and Plan         Arthritis    Essential hypertension    Lumbar spondylosis    LUANN on CPAP    Medicare annual wellness visit, subsequent    Diagnoses and all orders for this visit:    1. Medicare annual wellness visit, subsequent (Primary)  Comments:  Feels pretty good- limited by back " but stays busy- vaccines reviewed, RSV in fall. Labs today.    2. Arthritis  Comments:  wants to try name brand Celebrex- helped him more in the past.  Orders:  -     celecoxib (CeleBREX) 100 MG capsule; Take 1 capsule by mouth 2 (Two) Times a Day As Needed for Mild Pain or Moderate Pain.  Dispense: 180 capsule; Refill: 0    3. Essential hypertension  Comments:  much improved, no change in medication  Assessment & Plan:      Orders:  -     amLODIPine (NORVASC) 5 MG tablet; Take 1 tablet by mouth Daily.  Dispense: 90 tablet; Refill: 1  -     Comprehensive Metabolic Panel  -     Lipid Panel With / Chol / HDL Ratio    4. Lumbar spondylosis  Comments:  working with pain management- medications reviewed    5. LUANN on CPAP    Other orders  -     hydroCHLOROthiazide 12.5 MG tablet; Take 1 tablet by mouth Daily.  Dispense: 90 tablet; Refill: 1       New Medications Ordered This Visit   Medications    celecoxib (CeleBREX) 100 MG capsule     Sig: Take 1 capsule by mouth 2 (Two) Times a Day As Needed for Mild Pain or Moderate Pain.     Dispense:  180 capsule     Refill:  0    amLODIPine (NORVASC) 5 MG tablet     Sig: Take 1 tablet by mouth Daily.     Dispense:  90 tablet     Refill:  1    hydroCHLOROthiazide 12.5 MG tablet     Sig: Take 1 tablet by mouth Daily.     Dispense:  90 tablet     Refill:  1          Follow Up   Return in about 6 months (around 10/30/2025) for Recheck, Lab Today.  Patient was given instructions and counseling regarding his condition or for health maintenance advice. Please see specific information pulled into the AVS if appropriate.

## 2025-05-01 LAB
ALBUMIN SERPL-MCNC: 4.1 G/DL (ref 3.7–4.7)
ALP SERPL-CCNC: 86 IU/L (ref 44–121)
ALT SERPL-CCNC: 7 IU/L (ref 0–44)
AST SERPL-CCNC: 13 IU/L (ref 0–40)
BILIRUB SERPL-MCNC: 0.4 MG/DL (ref 0–1.2)
BUN SERPL-MCNC: 23 MG/DL (ref 8–27)
BUN/CREAT SERPL: 16 (ref 10–24)
CALCIUM SERPL-MCNC: 9.1 MG/DL (ref 8.6–10.2)
CHLORIDE SERPL-SCNC: 102 MMOL/L (ref 96–106)
CHOLEST SERPL-MCNC: 167 MG/DL (ref 100–199)
CHOLEST/HDLC SERPL: 4.2 RATIO (ref 0–5)
CO2 SERPL-SCNC: 22 MMOL/L (ref 20–29)
CREAT SERPL-MCNC: 1.42 MG/DL (ref 0.76–1.27)
EGFRCR SERPLBLD CKD-EPI 2021: 49 ML/MIN/1.73
GLOBULIN SER CALC-MCNC: 2.4 G/DL (ref 1.5–4.5)
GLUCOSE SERPL-MCNC: 94 MG/DL (ref 70–99)
HDLC SERPL-MCNC: 40 MG/DL
LDLC SERPL CALC-MCNC: 107 MG/DL (ref 0–99)
POTASSIUM SERPL-SCNC: 4.1 MMOL/L (ref 3.5–5.2)
PROT SERPL-MCNC: 6.5 G/DL (ref 6–8.5)
SODIUM SERPL-SCNC: 138 MMOL/L (ref 134–144)
TRIGL SERPL-MCNC: 110 MG/DL (ref 0–149)
VLDLC SERPL CALC-MCNC: 20 MG/DL (ref 5–40)

## 2025-05-01 RX ORDER — PRAMIPEXOLE DIHYDROCHLORIDE 0.25 MG/1
TABLET ORAL
Qty: 120 TABLET | Refills: 4 | Status: SHIPPED | OUTPATIENT
Start: 2025-05-01

## 2025-05-01 NOTE — TELEPHONE ENCOUNTER
Caller: Adrian Balderrama    Relationship: Self    Best call back number: [phone]    Requested Prescriptions:   Requested Prescriptions     Pending Prescriptions Disp Refills    pramipexole (MIRAPEX) 0.25 MG tablet 120 tablet 0     Sig: TAKE 3 TO 4 TABLETS BY MOUTH DAILY AS NEEDED      Pharmacy where request should be sent: [id number] [place] [id number] [facility] AT [facility] [phone] [facility] [phone]      Last office visit with prescribing clinician: 4/30/2025   Last telemedicine visit with prescribing clinician: Visit date not found   Next office visit with prescribing clinician: 11/3/2025     Additional details provided by patient: PATIENT STATES THAT [facility] DID NOT RECEIVE THIS MEDICATION. PATIENT STATES THAT IT WAS SAID IT WAS SENT THREE DAYS AGO, BUT [facility] DOESN'T HAVE THIS. PATIENT IS OUT AND NEEDS THIS TODAY.     Does the patient have less than a 3 day supply:  [x] Yes  [] No    Ángel Stout Rep   05/01/25 13:09 EDT

## 2025-05-12 RX ORDER — LOPERAMIDE HYDROCHLORIDE 2 MG/1
CAPSULE ORAL
Qty: 90 CAPSULE | Refills: 1 | Status: SHIPPED | OUTPATIENT
Start: 2025-05-12

## 2025-05-19 ENCOUNTER — TELEPHONE (OUTPATIENT)
Dept: INTERNAL MEDICINE | Facility: CLINIC | Age: 85
End: 2025-05-19
Payer: MEDICARE

## 2025-05-19 NOTE — TELEPHONE ENCOUNTER
Caller: Adrian Balderrama    Relationship: Self    Best call back number: [phone]     What medication are you requesting: MELOXICAM 7.5 MG; PATIENT PAIN MANAGEMENT TOLD THE PATIENT TO REACH DR. MORLEY AND SEE ABOUT PRESCRIBING THIS FOR HIS ARTHRITIS PAIN SINCE HE IS NO LONGER ON THE CELEBREX    90 DAY SUPPLY IF POSSIBLE    If a prescription is needed, what is your preferred pharmacy and phone number: [id number] - [facility] [address] [facility] [address] [facility] AT [facility] [phone] [facility] [phone]      Additional notes:  PLEASE CALL PATIENT TO ADVISE

## 2025-05-19 NOTE — TELEPHONE ENCOUNTER
Ok, I see it now- if he can't take the Celebrex due to renal insufficiency then he can't do the Mobic either. Tylenol only for pain.

## 2025-05-27 RX ORDER — LOPERAMIDE HYDROCHLORIDE 2 MG/1
CAPSULE ORAL
Qty: 90 CAPSULE | Refills: 1 | Status: SHIPPED | OUTPATIENT
Start: 2025-05-27

## 2025-06-02 DIAGNOSIS — I10 ESSENTIAL HYPERTENSION: ICD-10-CM

## 2025-06-02 RX ORDER — LISINOPRIL 40 MG/1
40 TABLET ORAL 2 TIMES DAILY
Qty: 180 TABLET | Refills: 0 | OUTPATIENT
Start: 2025-06-02

## 2025-06-09 NOTE — PROGRESS NOTES
"Chief Complaint   Patient presents with    Heartburn         History of Present Illness  Patient is an 85-year-old male who presents today for Follow-up.  He is a new patient to me.  He has a personal history of colon cancer diagnosed in 2004 status post left hemicolectomy.  He also has a history of prostate cancer treated with chemo and radiation.    He has a history of esophageal stenosis status post dilation and GERD with esophagitis.    Patient presents today for follow-up.  He continues on pantoprazole for GERD.  He feels reflux symptoms are under good control.  He has had no further issues with dysphagia since dilation of esophageal stenosis in 2023.  He denies any abdominal pain.  Denies any nausea or vomiting.  Denies any bowel complaints.    He takes loperamide to help control stool frequency after his prior hemicolectomy.    He is not interested in any further surveillance colonoscopies.     Result Review :       Tissue Pathology Exam (08/17/2023 11:27)    UPPER GI ENDOSCOPY (08/17/2023 11:12)    Office Visit with Irais Sparrow APRN (08/16/2023)    Vital Signs:   /78   Pulse 62   Temp 97.6 °F (36.4 °C)   Ht 185.4 cm (72.99\")   Wt 97 kg (213 lb 12.8 oz)   SpO2 100%   BMI 28.21 kg/m²     Body mass index is 28.21 kg/m².     Physical Exam  Vitals reviewed.   Constitutional:       General: He is not in acute distress.     Appearance: He is well-developed.   HENT:      Head: Normocephalic and atraumatic.   Pulmonary:      Effort: Pulmonary effort is normal. No respiratory distress.   Abdominal:      General: Bowel sounds are normal. There is no distension.      Palpations: Abdomen is soft.      Tenderness: There is no abdominal tenderness.   Skin:     General: Skin is dry.      Coloration: Skin is not pale.   Neurological:      Mental Status: He is alert and oriented to person, place, and time.   Psychiatric:         Thought Content: Thought content normal.           Assessment and Plan  "   Diagnoses and all orders for this visit:    1. Gastroesophageal reflux disease with esophagitis without hemorrhage (Primary)    2. Esophageal stenosis    3. History of colon cancer         Discussion  Patient presents today for follow-up.  Reflux symptoms are well-controlled on pantoprazole and will continue current treatment.  Due to esophageal stenosis, recommended continuing PPI to help prevent further stricturing.  Currently he is not experiencing any issues with dysphagia.  Discussed with him today if dysphagia recurs we can repeat EGD with dilation if needed.  We discussed consideration for colonoscopy, he declines any future surveillance.  Will plan on follow-up in 1 year for reassessment he is instructed to call with any new or worsening symptoms.          Follow Up   Return in about 1 year (around 6/10/2026), or if symptoms worsen or fail to improve.    Patient Instructions   For GERD with esophagitis, continue pantoprazole daily as prescribed.    If trouble swallowing returns, please notify the office and can repeat EGD with dilation if needed.

## 2025-06-10 ENCOUNTER — OFFICE VISIT (OUTPATIENT)
Dept: GASTROENTEROLOGY | Facility: CLINIC | Age: 85
End: 2025-06-10
Payer: MEDICARE

## 2025-06-10 VITALS
OXYGEN SATURATION: 100 % | TEMPERATURE: 97.6 F | SYSTOLIC BLOOD PRESSURE: 122 MMHG | DIASTOLIC BLOOD PRESSURE: 78 MMHG | HEIGHT: 73 IN | WEIGHT: 213.8 LBS | BODY MASS INDEX: 28.34 KG/M2 | HEART RATE: 62 BPM

## 2025-06-10 DIAGNOSIS — K22.2 ESOPHAGEAL STENOSIS: ICD-10-CM

## 2025-06-10 DIAGNOSIS — K21.00 GASTROESOPHAGEAL REFLUX DISEASE WITH ESOPHAGITIS WITHOUT HEMORRHAGE: Primary | ICD-10-CM

## 2025-06-10 DIAGNOSIS — Z85.038 HISTORY OF COLON CANCER: ICD-10-CM

## 2025-06-10 PROCEDURE — 1159F MED LIST DOCD IN RCRD: CPT | Performed by: NURSE PRACTITIONER

## 2025-06-10 PROCEDURE — 99213 OFFICE O/P EST LOW 20 MIN: CPT | Performed by: NURSE PRACTITIONER

## 2025-06-10 PROCEDURE — 3078F DIAST BP <80 MM HG: CPT | Performed by: NURSE PRACTITIONER

## 2025-06-10 PROCEDURE — 3074F SYST BP LT 130 MM HG: CPT | Performed by: NURSE PRACTITIONER

## 2025-06-10 PROCEDURE — 1160F RVW MEDS BY RX/DR IN RCRD: CPT | Performed by: NURSE PRACTITIONER

## 2025-06-10 NOTE — PATIENT INSTRUCTIONS
For GERD with esophagitis, continue pantoprazole daily as prescribed.    If trouble swallowing returns, please notify the office and can repeat EGD with dilation if needed.

## 2025-06-11 DIAGNOSIS — I10 ESSENTIAL HYPERTENSION: Chronic | ICD-10-CM

## 2025-06-11 RX ORDER — LISINOPRIL 20 MG/1
20 TABLET ORAL DAILY
Qty: 90 TABLET | Refills: 0 | Status: SHIPPED | OUTPATIENT
Start: 2025-06-11

## 2025-06-13 RX ORDER — LOPERAMIDE HYDROCHLORIDE 2 MG/1
CAPSULE ORAL
Qty: 90 CAPSULE | Refills: 1 | Status: SHIPPED | OUTPATIENT
Start: 2025-06-13

## 2025-06-22 DIAGNOSIS — I10 ESSENTIAL HYPERTENSION: ICD-10-CM

## 2025-06-23 RX ORDER — LISINOPRIL 40 MG/1
40 TABLET ORAL 2 TIMES DAILY
Qty: 180 TABLET | Refills: 0 | OUTPATIENT
Start: 2025-06-23

## 2025-06-24 ENCOUNTER — TELEPHONE (OUTPATIENT)
Dept: INTERNAL MEDICINE | Facility: CLINIC | Age: 85
End: 2025-06-24
Payer: MEDICARE

## 2025-06-24 DIAGNOSIS — I10 ESSENTIAL HYPERTENSION: Primary | ICD-10-CM

## 2025-06-24 RX ORDER — LISINOPRIL 40 MG/1
40 TABLET ORAL DAILY
Qty: 90 TABLET | Refills: 1 | Status: SHIPPED | OUTPATIENT
Start: 2025-06-24

## 2025-06-24 NOTE — TELEPHONE ENCOUNTER
Pt called to to update how he is taking his lisinopril he looked at his bottles and he is taking 40mg bid and 20mg once daily with amlodipine 5mg     Is this ok?  Too much lisinopril?

## 2025-06-24 NOTE — TELEPHONE ENCOUNTER
That is too much- 40 is probably max dose but 60 would be ok. Cut out one of the 40 mg tablets and watch BP

## 2025-06-26 RX ORDER — FLUOXETINE HYDROCHLORIDE 40 MG/1
40 CAPSULE ORAL DAILY
Qty: 90 CAPSULE | Refills: 1 | Status: SHIPPED | OUTPATIENT
Start: 2025-06-26

## 2025-06-30 RX ORDER — HYDROCHLOROTHIAZIDE 12.5 MG/1
12.5 TABLET ORAL DAILY
Qty: 90 TABLET | Refills: 1 | Status: SHIPPED | OUTPATIENT
Start: 2025-06-30

## 2025-07-27 DIAGNOSIS — M19.90 ARTHRITIS: Chronic | ICD-10-CM

## 2025-07-28 RX ORDER — CELECOXIB 100 MG/1
CAPSULE ORAL
Qty: 180 CAPSULE | Refills: 0 | Status: SHIPPED | OUTPATIENT
Start: 2025-07-28

## 2025-08-22 RX ORDER — BISMUTH SUBSALICYLATE 525 MG/15ML
SUSPENSION ORAL
Qty: 96 TABLET | OUTPATIENT
Start: 2025-08-22

## 2025-08-25 RX ORDER — LOPERAMIDE HYDROCHLORIDE 2 MG/1
2 CAPSULE ORAL 4 TIMES DAILY PRN
Qty: 360 CAPSULE | Refills: 1 | Status: SHIPPED | OUTPATIENT
Start: 2025-08-25

## (undated) DEVICE — ADAPT CLN SCPE ENDO PORPOISE BX/50 DISP

## (undated) DEVICE — DEV INFL ALLIANCE2 SYS

## (undated) DEVICE — GOWN ,SIRUS,NONREINFORCED 3XL: Brand: MEDLINE

## (undated) DEVICE — ESOPHAGEAL BALLOON DILATATION CATHETER: Brand: CRE FIXED WIRE

## (undated) DEVICE — BITEBLOCK OMNI BLOC

## (undated) DEVICE — SINGLE-USE BIOPSY FORCEPS: Brand: RADIAL JAW 4

## (undated) DEVICE — CANN O2 ETCO2 FITS ALL CONN CO2 SMPL A/ 7IN DISP LF

## (undated) DEVICE — GOWN ISOL W/THUMB UNIV BLU BX/15

## (undated) DEVICE — Device

## (undated) DEVICE — KT ORCA ORCAPOD DISP STRL

## (undated) DEVICE — VIAL FORMLN CAP 10PCT 20ML